# Patient Record
Sex: MALE | Race: WHITE | NOT HISPANIC OR LATINO | Employment: FULL TIME | URBAN - NONMETROPOLITAN AREA
[De-identification: names, ages, dates, MRNs, and addresses within clinical notes are randomized per-mention and may not be internally consistent; named-entity substitution may affect disease eponyms.]

---

## 2023-09-14 ENCOUNTER — OFFICE VISIT (OUTPATIENT)
Dept: INTERNAL MEDICINE | Facility: CLINIC | Age: 58
End: 2023-09-14
Payer: COMMERCIAL

## 2023-09-14 VITALS
TEMPERATURE: 97.8 F | HEART RATE: 73 BPM | BODY MASS INDEX: 33.8 KG/M2 | DIASTOLIC BLOOD PRESSURE: 88 MMHG | SYSTOLIC BLOOD PRESSURE: 153 MMHG | WEIGHT: 223 LBS | HEIGHT: 68 IN | OXYGEN SATURATION: 95 %

## 2023-09-14 DIAGNOSIS — R73.9 ELEVATED BLOOD SUGAR: ICD-10-CM

## 2023-09-14 DIAGNOSIS — I50.812 CHRONIC RIGHT-SIDED CONGESTIVE HEART FAILURE: ICD-10-CM

## 2023-09-14 DIAGNOSIS — Z72.0 TOBACCO ABUSE: ICD-10-CM

## 2023-09-14 DIAGNOSIS — M54.42 ACUTE MIDLINE LOW BACK PAIN WITH BILATERAL SCIATICA: Primary | ICD-10-CM

## 2023-09-14 DIAGNOSIS — M54.41 ACUTE MIDLINE LOW BACK PAIN WITH BILATERAL SCIATICA: Primary | ICD-10-CM

## 2023-09-14 DIAGNOSIS — Z12.5 SCREENING PSA (PROSTATE SPECIFIC ANTIGEN): ICD-10-CM

## 2023-09-14 RX ORDER — ISOSORBIDE MONONITRATE 60 MG/1
1 TABLET, EXTENDED RELEASE ORAL DAILY
COMMUNITY
Start: 2023-07-31

## 2023-09-14 RX ORDER — DICLOFENAC SODIUM 75 MG/1
75 TABLET, DELAYED RELEASE ORAL 2 TIMES DAILY
Qty: 60 TABLET | Refills: 11 | Status: SHIPPED | OUTPATIENT
Start: 2023-09-14

## 2023-09-14 RX ORDER — AMLODIPINE BESYLATE AND BENAZEPRIL HYDROCHLORIDE 5; 20 MG/1; MG/1
1 CAPSULE ORAL DAILY
COMMUNITY

## 2023-09-14 RX ORDER — CHOLECALCIFEROL (VITAMIN D3) 125 MCG
5 CAPSULE ORAL
COMMUNITY

## 2023-09-14 RX ORDER — ONDANSETRON 4 MG/1
4 TABLET, FILM COATED ORAL DAILY
Qty: 20 TABLET | Refills: 5 | Status: SHIPPED | OUTPATIENT
Start: 2023-09-14

## 2023-09-14 RX ORDER — PROPRANOLOL HYDROCHLORIDE 40 MG/1
20 TABLET ORAL 2 TIMES DAILY
Qty: 180 TABLET | Refills: 3 | Status: SHIPPED | OUTPATIENT
Start: 2023-09-14

## 2023-09-14 RX ORDER — KETOROLAC TROMETHAMINE 30 MG/ML
60 INJECTION, SOLUTION INTRAMUSCULAR; INTRAVENOUS ONCE
Status: COMPLETED | OUTPATIENT
Start: 2023-09-14 | End: 2023-09-14

## 2023-09-14 RX ORDER — ATORVASTATIN CALCIUM 80 MG/1
80 TABLET, FILM COATED ORAL DAILY
Qty: 90 TABLET | Refills: 3 | Status: SHIPPED | OUTPATIENT
Start: 2023-09-14

## 2023-09-14 RX ORDER — VARENICLINE TARTRATE 1 MG/1
1 TABLET, FILM COATED ORAL 2 TIMES DAILY
Qty: 56 TABLET | Refills: 4 | Status: SHIPPED | OUTPATIENT
Start: 2023-10-12 | End: 2024-02-29

## 2023-09-14 RX ORDER — VARENICLINE TARTRATE 0.5 MG/1
TABLET, FILM COATED ORAL
Qty: 95 TABLET | Refills: 0 | Status: SHIPPED | OUTPATIENT
Start: 2023-09-14

## 2023-09-14 RX ORDER — ONDANSETRON 4 MG/1
4 TABLET, FILM COATED ORAL DAILY
COMMUNITY
Start: 2023-05-04 | End: 2023-09-14 | Stop reason: SDUPTHER

## 2023-09-14 RX ORDER — IBUPROFEN 200 MG
200 TABLET ORAL EVERY 8 HOURS PRN
COMMUNITY
End: 2023-09-14

## 2023-09-14 RX ORDER — CLONIDINE HYDROCHLORIDE 0.1 MG/1
0.1 TABLET ORAL 3 TIMES DAILY
COMMUNITY

## 2023-09-14 RX ORDER — BUSPIRONE HYDROCHLORIDE 7.5 MG/1
3.75 TABLET ORAL 2 TIMES DAILY
COMMUNITY

## 2023-09-14 RX ORDER — ASPIRIN 81 MG/1
81 TABLET ORAL
COMMUNITY

## 2023-09-14 RX ORDER — METHYLPREDNISOLONE ACETATE 80 MG/ML
80 INJECTION, SUSPENSION INTRA-ARTICULAR; INTRALESIONAL; INTRAMUSCULAR; SOFT TISSUE ONCE
Status: COMPLETED | OUTPATIENT
Start: 2023-09-14 | End: 2023-09-14

## 2023-09-14 RX ORDER — TADALAFIL 20 MG/1
40 TABLET ORAL
COMMUNITY

## 2023-09-14 RX ORDER — PROPRANOLOL HYDROCHLORIDE 40 MG/1
0.5 TABLET ORAL 2 TIMES DAILY
COMMUNITY
Start: 2023-06-13 | End: 2023-09-14 | Stop reason: SDUPTHER

## 2023-09-14 RX ORDER — ERGOCALCIFEROL 1.25 MG/1
50000 CAPSULE ORAL
COMMUNITY

## 2023-09-14 RX ORDER — ATORVASTATIN CALCIUM 80 MG/1
1 TABLET, FILM COATED ORAL DAILY
COMMUNITY
Start: 2023-06-13 | End: 2023-09-14 | Stop reason: SDUPTHER

## 2023-09-14 RX ORDER — CETIRIZINE HYDROCHLORIDE 10 MG/1
1 TABLET ORAL DAILY
COMMUNITY

## 2023-09-14 RX ORDER — FUROSEMIDE 20 MG/1
1 TABLET ORAL DAILY
COMMUNITY
Start: 2023-06-13

## 2023-09-14 RX ADMIN — KETOROLAC TROMETHAMINE 60 MG: 30 INJECTION, SOLUTION INTRAMUSCULAR; INTRAVENOUS at 14:11

## 2023-09-14 RX ADMIN — METHYLPREDNISOLONE ACETATE 80 MG: 80 INJECTION, SUSPENSION INTRA-ARTICULAR; INTRALESIONAL; INTRAMUSCULAR; SOFT TISSUE at 14:12

## 2023-09-14 NOTE — PROGRESS NOTES
"Chief Complaint  Hypertension and Hyperlipidemia    Subjective        Juarez Wade presents to [facility]  History of Present Illness    The Patient presents for evaluation of low back pain. The patient has had no prior back problems. Symptoms have been present for 8 week and are unchanged.  Onset was related to / precipitated by no known injury. The pain is located in the across the lower back and radiates to the right hip, left hip. The pain is described as aching and occurs all day. He rates his pain as mild. Symptoms are exacerbated by lying down. Symptoms are improved by change in body position, NSAIDs, and stretching. he has also tried nothing which provided no symptom relief. he has no other symptoms associated with the back pain. The patient has no \"red flag\" history indicative of complicated back pain.     Patient would like to get a steroid and Toradol shot to help with his discomfort    I discussed with this patient tobacco product cessation.   I highlighted risks and damage it could cause if he continues to smoke or use other tobacco products.  I discussed the direct link to lung and throat cancer and the benefits of quitting.  We talked about the different options to assist with cessation including Chantix, Buproprion, patches, gum and cessation classes.  I spent 5 minutes discussing these issues with the patient.  Patient reported willingness to quit and was interested in setting a quit date for today.  He had used Chantix in the past and would like to get back on this at this time prescription for Chantix prescribed today for initiation    Patient would also like to do his routine wellness screening labs today.  Patient had had a previously elevated blood sugars we will check a hemoglobin A1c patient would also like to go ahead and get PSA screening done at this time.  Due to his history of congestive heart failure we will also obtain a CBC lipid profile and " "comprehensive metabolic panel patient also takes occasional Zofran for nausea and would like to have this available.  No other problems complaints or concerns    Objective   Vital Signs:  /88 (BP Location: Left arm, Patient Position: Sitting, Cuff Size: Adult)   Pulse 73   Temp 97.8 °F (36.6 °C) (Infrared)   Ht 171.5 cm (67.5\")   Wt 101 kg (223 lb)   SpO2 95%   BMI 34.41 kg/m²   Estimated body mass index is 34.41 kg/m² as calculated from the following:    Height as of this encounter: 171.5 cm (67.5\").    Weight as of this encounter: 101 kg (223 lb).             Physical Exam  Vitals and nursing note reviewed.   Constitutional:       General: He is not in acute distress.     Appearance: Normal appearance.   HENT:      Head: Normocephalic.   Eyes:      Extraocular Movements: Extraocular movements intact.      Pupils: Pupils are equal, round, and reactive to light.   Cardiovascular:      Rate and Rhythm: Normal rate and regular rhythm.      Heart sounds: Normal heart sounds. No murmur heard.  Pulmonary:      Effort: Pulmonary effort is normal. No respiratory distress.      Breath sounds: Normal breath sounds. No rhonchi or rales.   Abdominal:      General: Abdomen is flat. Bowel sounds are normal.      Palpations: Abdomen is soft.   Neurological:      General: No focal deficit present.      Mental Status: He is alert.      Result Review :                   Assessment and Plan   Diagnoses and all orders for this visit:    1. Acute midline low back pain with bilateral sciatica (Primary)  -     XR Spine Lumbar 2 or 3 View (In Office)  -     diclofenac (VOLTAREN) 75 MG EC tablet; Take 1 tablet by mouth 2 (Two) Times a Day.  Dispense: 60 tablet; Refill: 11  -     methylPREDNISolone acetate (DEPO-medrol) injection 80 mg  -     ketorolac (TORADOL) injection 60 mg    2. Tobacco abuse  -     varenicline (CHANTIX) 1 MG tablet; Take 1 tablet by mouth 2 (Two) Times a Day for 140 days.  Dispense: 56 tablet; Refill: 4  - "     varenicline (Chantix) 0.5 MG tablet; Take 0.5 mg po daily x 3 days, then 0.5 mg po bid x 4 days, then 1 mg po bid  Dispense: 95 tablet; Refill: 0    3. Chronic right-sided congestive heart failure  -     propranolol (INDERAL) 40 MG tablet; Take 0.5 tablets by mouth 2 (Two) Times a Day.  Dispense: 180 tablet; Refill: 3  -     atorvastatin (LIPITOR) 80 MG tablet; Take 1 tablet by mouth Daily.  Dispense: 90 tablet; Refill: 3  -     CBC & Differential  -     Lipid Panel  -     Comprehensive Metabolic Panel    4. Elevated blood sugar  -     Hemoglobin A1c; Future    5. Screening PSA (prostate specific antigen)  -     PSA Screen    Other orders  -     ondansetron (ZOFRAN) 4 MG tablet; Take 1 tablet by mouth Daily.  Dispense: 20 tablet; Refill: 5      EMR Dictation/Transcription disclaimer:   Some of this note may be an electronic transcription/translation of spoken language to printed text. The electronic translation of spoken language may permit erroneous, or at times, nonsensical words or phrases to be inadvertently transcribed; Although I have reviewed the note for such errors, some may still exist.        Follow Up   No follow-ups on file.  Patient was given instructions and counseling regarding his condition or for health maintenance advice. Please see specific information pulled into the AVS if appropriate.

## 2023-09-16 LAB
ALBUMIN SERPL-MCNC: 4.3 G/DL (ref 3.8–4.9)
ALBUMIN/GLOB SERPL: 1.4 {RATIO} (ref 1.2–2.2)
ALP SERPL-CCNC: 115 IU/L (ref 44–121)
ALT SERPL-CCNC: 17 IU/L (ref 0–44)
AST SERPL-CCNC: 11 IU/L (ref 0–40)
BASOPHILS # BLD AUTO: 0.2 X10E3/UL (ref 0–0.2)
BASOPHILS NFR BLD AUTO: 1 %
BILIRUB SERPL-MCNC: 0.4 MG/DL (ref 0–1.2)
BUN SERPL-MCNC: 11 MG/DL (ref 6–24)
BUN/CREAT SERPL: 12 (ref 9–20)
CALCIUM SERPL-MCNC: 9.7 MG/DL (ref 8.7–10.2)
CHLORIDE SERPL-SCNC: 98 MMOL/L (ref 96–106)
CHOLEST SERPL-MCNC: 246 MG/DL (ref 100–199)
CO2 SERPL-SCNC: 21 MMOL/L (ref 20–29)
CREAT SERPL-MCNC: 0.91 MG/DL (ref 0.76–1.27)
EGFRCR SERPLBLD CKD-EPI 2021: 97 ML/MIN/1.73
EOSINOPHIL # BLD AUTO: 0.2 X10E3/UL (ref 0–0.4)
EOSINOPHIL NFR BLD AUTO: 2 %
ERYTHROCYTE [DISTWIDTH] IN BLOOD BY AUTOMATED COUNT: 15.9 % (ref 11.6–15.4)
GLOBULIN SER CALC-MCNC: 3 G/DL (ref 1.5–4.5)
GLUCOSE SERPL-MCNC: 97 MG/DL (ref 70–99)
HCT VFR BLD AUTO: 42.9 % (ref 37.5–51)
HDLC SERPL-MCNC: 59 MG/DL
HGB BLD-MCNC: 12.6 G/DL (ref 13–17.7)
IMM GRANULOCYTES # BLD AUTO: 0 X10E3/UL (ref 0–0.1)
IMM GRANULOCYTES NFR BLD AUTO: 0 %
LDLC SERPL CALC-MCNC: 172 MG/DL (ref 0–99)
LYMPHOCYTES # BLD AUTO: 2.7 X10E3/UL (ref 0.7–3.1)
LYMPHOCYTES NFR BLD AUTO: 20 %
MCH RBC QN AUTO: 25.1 PG (ref 26.6–33)
MCHC RBC AUTO-ENTMCNC: 29.4 G/DL (ref 31.5–35.7)
MCV RBC AUTO: 86 FL (ref 79–97)
MONOCYTES # BLD AUTO: 1.1 X10E3/UL (ref 0.1–0.9)
MONOCYTES NFR BLD AUTO: 8 %
NEUTROPHILS # BLD AUTO: 9.8 X10E3/UL (ref 1.4–7)
NEUTROPHILS NFR BLD AUTO: 69 %
PLATELET # BLD AUTO: 375 X10E3/UL (ref 150–450)
POTASSIUM SERPL-SCNC: 4.1 MMOL/L (ref 3.5–5.2)
PROT SERPL-MCNC: 7.3 G/DL (ref 6–8.5)
PSA SERPL-MCNC: 2.1 NG/ML (ref 0–4)
RBC # BLD AUTO: 5.02 X10E6/UL (ref 4.14–5.8)
SODIUM SERPL-SCNC: 138 MMOL/L (ref 134–144)
TRIGL SERPL-MCNC: 86 MG/DL (ref 0–149)
VLDLC SERPL CALC-MCNC: 15 MG/DL (ref 5–40)
WBC # BLD AUTO: 14.1 X10E3/UL (ref 3.4–10.8)

## 2023-09-20 ENCOUNTER — APPOINTMENT (OUTPATIENT)
Dept: ULTRASOUND IMAGING | Facility: HOSPITAL | Age: 58
End: 2023-09-20
Payer: COMMERCIAL

## 2023-09-20 ENCOUNTER — HOSPITAL ENCOUNTER (EMERGENCY)
Facility: HOSPITAL | Age: 58
Discharge: HOME | End: 2023-09-20
Attending: STUDENT IN AN ORGANIZED HEALTH CARE EDUCATION/TRAINING PROGRAM
Payer: COMMERCIAL

## 2023-09-20 VITALS
WEIGHT: 218 LBS | BODY MASS INDEX: 34.21 KG/M2 | HEIGHT: 67 IN | DIASTOLIC BLOOD PRESSURE: 60 MMHG | OXYGEN SATURATION: 95 % | RESPIRATION RATE: 20 BRPM | SYSTOLIC BLOOD PRESSURE: 121 MMHG | TEMPERATURE: 98.5 F | HEART RATE: 61 BPM

## 2023-09-20 DIAGNOSIS — N45.1 EPIDIDYMITIS: Primary | ICD-10-CM

## 2023-09-20 DIAGNOSIS — I86.1 VARICOCELE: ICD-10-CM

## 2023-09-20 DIAGNOSIS — N50.812 PAIN IN LEFT TESTICLE: ICD-10-CM

## 2023-09-20 LAB
BILIRUB UR QL STRIP: NEGATIVE
CLARITY UR: CLEAR
COLOR UR: YELLOW
GLUCOSE UR STRIP-MCNC: NEGATIVE MG/DL
HGB UR QL STRIP.AUTO: NEGATIVE
KETONES UR QL STRIP: NEGATIVE
LEUKOCYTE ESTERASE UR QL STRIP.AUTO: NEGATIVE
NITRITE UR QL STRIP: NEGATIVE
PH UR STRIP.AUTO: 6.5 [PH] (ref 5–8)
PROT UR QL STRIP: NEGATIVE
SP GR UR STRIP: 1.01 (ref 1–1.03)
UROBILINOGEN UR QL STRIP: NORMAL

## 2023-09-20 PROCEDURE — 25010000002 CEFTRIAXONE PER 250 MG: Performed by: STUDENT IN AN ORGANIZED HEALTH CARE EDUCATION/TRAINING PROGRAM

## 2023-09-20 PROCEDURE — 81003 URINALYSIS AUTO W/O SCOPE: CPT | Performed by: STUDENT IN AN ORGANIZED HEALTH CARE EDUCATION/TRAINING PROGRAM

## 2023-09-20 PROCEDURE — 93975 VASCULAR STUDY: CPT

## 2023-09-20 PROCEDURE — 99284 EMERGENCY DEPT VISIT MOD MDM: CPT

## 2023-09-20 PROCEDURE — 96372 THER/PROPH/DIAG INJ SC/IM: CPT

## 2023-09-20 PROCEDURE — 76870 US EXAM SCROTUM: CPT

## 2023-09-20 RX ORDER — DOXYCYCLINE 100 MG/1
100 CAPSULE ORAL 2 TIMES DAILY
Qty: 20 CAPSULE | Refills: 0 | Status: SHIPPED | OUTPATIENT
Start: 2023-09-20 | End: 2023-09-30

## 2023-09-20 RX ADMIN — LIDOCAINE HYDROCHLORIDE 500 MG: 10 INJECTION, SOLUTION EPIDURAL; INFILTRATION; INTRACAUDAL; PERINEURAL at 23:24

## 2023-09-20 NOTE — Clinical Note
[facility] EMERGENCY DEPARTMENT  [address]  [facility] [address]  Phone: [phone]    Juarez Wade was seen and treated in our emergency department on 9/20/2023.  He may return to work on 09/22/2023.         Thank you for choosing [facility].    Tera Brannon MD

## 2023-09-21 NOTE — DISCHARGE INSTRUCTIONS
Today you are seen for your testicle pain and you have epididymitis which is an infection of part of the testicle as well as a varicocele which is a pocket of fluid that can occur within your scrotum.  Please take the prescribed antibiotic and follow-up department care provider within 2 days for further management.  Many primary care providers manage this but if he would like you to follow-up with a neurologist I have also attached the number for referral.  If any of her symptoms worsen prior please return to the emergency department immediately.

## 2023-09-21 NOTE — ED PROVIDER NOTES
EMERGENCY DEPARTMENT ATTENDING PROGRESS NOTE    Patient Name: Juarez Wade    Chief Complaint   Patient presents with    Testicle Pain         MEDICAL DECISION MAKING (ADDENDUM TO MDM OF PRIOR PROVIDER):    Juarez Wade is a 58 y.o. male who was initially seen by the prior emergency medicine provider, Dr. Yung. Please see their note for full history and physical exam.    Patient had presented to the ED with acicular pain he was suspicious for epididymitis.  Ultrasound read was pending.  Urinalysis has been normal.    Labs Reviewed   URINALYSIS W/ CULTURE IF INDICATED - Normal    Narrative:     In absence of clinical symptoms, the presence of pyuria, bacteria, and/or nitrites on the urinalysis result does not correlate with infection.  Urine microscopic not indicated.     US Scrotum & Testicles   Final Result   1. Diffuse enlargement and increased vascularity of the left epididymis   suggesting acute epididymitis. There is a moderate left-sided hydrocele   which does demonstrate some complexity including internal septations   suggesting it may represent a pyocele.   2. Right testicle is normal in appearance. There is normal color flow to   both testicles with no evidence of torsion. A small right hydrocele is   present.       This report was signed and finalized on 9/20/2023 10:16 PM CDT by Dr. Angelo Boogie MD.          US Testicular or Ovarian Vascular Complete    (Results Pending)       Ultrasound consistent with epididymitis with a varicocele.  Patient was given intramuscular ceftriaxone and discharged with a scription for doxycycline for appropriate treatment of epididymitis.  Counseled regarding results as well as plan to follow with his primary care provider or urology as an outpatient closely.  He is given return precautions to the emergency department for worsening symptoms.      ED Diagnosis:  Pain in left testicle; Epididymitis; Varicocele    Disposition: to home  Follow up plan: PCP follow  up within 2 days, urology within 1 week if primary care provider does not want to manage, return to ED immediately if symptoms worsen        Signed:  Tera Brannon MD  Emergency Medicine Physician    Please note that portions of this note were completed with a voice recognition program.      Tera Brannon MD  09/21/23 0151

## 2023-09-21 NOTE — ED PROVIDER NOTES
Subjective   History of Present Illness  Patient presents due to testicular pain.  Present in his left testicle.  Present for 6 days.  Gradual in onset.  Mostly feels like an ache in his posterior left testicle.  Moves up his left inguinal canal.  No history of hernia.  No abdominal pain.  Eating and drinking normally, no vomiting or nausea.  No diarrhea, no difficulty with urination or dysuria.  He has also noticed some sedimentary changes in his urine and his urine has a strong smell.  No history of testicular problems or surgeries on his testicles.  Has a history of high blood pressure and his surgical history is reviewed as per below.    Review of Systems   Constitutional:  Negative for chills and fever.   Respiratory:  Negative for cough and shortness of breath.    Cardiovascular:  Negative for chest pain and palpitations.   Gastrointestinal:  Negative for abdominal pain and vomiting.   Genitourinary:  Positive for dysuria and testicular pain. Negative for difficulty urinating.   Neurological:  Negative for syncope and light-headedness.     Past Medical History:   Diagnosis Date    Anxiety     GERD (gastroesophageal reflux disease)     Hyperlipidemia     Hypertension     Vitamin D deficiency        Allergies   Allergen Reactions    Adhesive Tape Rash       Past Surgical History:   Procedure Laterality Date    APPENDECTOMY      CARDIAC CATHETERIZATION      CHOLECYSTECTOMY      HERNIA REPAIR         Family History   Problem Relation Age of Onset    Heart disease Mother     Dementia Mother     No Known Problems Father     Cancer Maternal Uncle        Social History     Socioeconomic History    Marital status:    Tobacco Use    Smoking status: Every Day     Packs/day: 0.50     Years: 10.00     Pack years: 5.00     Types: Cigarettes     Last attempt to quit: 2023     Years since quittin.0    Smokeless tobacco: Never   Vaping Use    Vaping Use: Never used   Substance and Sexual Activity    Alcohol  use: Yes     Comment: OCC    Drug use: Never           Objective   Physical Exam  Vitals reviewed.   Constitutional:       General: He is not in acute distress.  HENT:      Head: Normocephalic and atraumatic.   Eyes:      Extraocular Movements: Extraocular movements intact.      Conjunctiva/sclera: Conjunctivae normal.   Cardiovascular:      Pulses: Normal pulses.      Heart sounds: Normal heart sounds.   Pulmonary:      Effort: Pulmonary effort is normal. No respiratory distress.   Abdominal:      General: Abdomen is flat. There is no distension.      Tenderness: There is no abdominal tenderness.   Genitourinary:     Comments: Left testicle swollen with posterior tenderness to palpation.  Cremasteric is intact on left.  Normal lie of the testicle.  No palpable hernia in the inguinal canal.  No overlying induration, erythema, or warmth.  Musculoskeletal:      Cervical back: Normal range of motion and neck supple.   Skin:     General: Skin is warm and dry.   Neurological:      General: No focal deficit present.      Mental Status: He is alert. Mental status is at baseline.   Psychiatric:         Behavior: Behavior normal.         Thought Content: Thought content normal.       Procedures           ED Course                                           Medical Decision Making  Problems Addressed:  Epididymitis: complicated acute illness or injury  Pain in left testicle: complicated acute illness or injury  Varicocele: complicated acute illness or injury    Amount and/or Complexity of Data Reviewed  Radiology: ordered.    Risk  Prescription drug management.        Juarez Wade is a 58 y.o. male with PMH above who presents to the Emergency Department with testicular pain.  Most likely epididymitis.  Also consider UTI.  Vital signs are stable, patient urinating normal amount, not fluid overloaded, no history of renal dysfunction, serum labs are not required at this time with an isolated testicular complaint.  Less likely  torsion given the exam; urinalysis and ultrasound ordered.     ED Course:   -workup pending at time of signout to Dr. Brannon      Final diagnosis: testicular pain    All questions answered. Patient/family was understanding and in agreement with today's assessment and plan. The patient was monitored during their stay in the ED and dispositioned without acute event.    Electronically signed by:  Pancho Yung MD 9/21/2023 10:04 CDT      Note: Dragon medical dictation software was used in the creation of this note.        Final diagnoses:   Pain in left testicle   Epididymitis   Varicocele       ED Disposition  ED Disposition       ED Disposition   Discharge    Condition   Stable    Comment   --               Karthikeyan Marte MD  [address] [facility] [id number] [phone]    Schedule an appointment as soon as possible for a visit in 2 days      Livan Alberts MD  [address] [facility]  [place] [id number] [phone]    Schedule an appointment as soon as possible for a visit in 1 week  As needed, if your primary care provider does not want to manage    [facility] EMERGENCY DEPARTMENT  [address] [place] [id number] [phone]    If symptoms worsen         Medication List        New Prescriptions      doxycycline 100 MG capsule  Commonly known as: MONODOX  Take 1 capsule by mouth 2 (Two) Times a Day for 10 days.               Where to Get Your Medications        These medications were sent to [facility] [address] [place] [address] - [address] [place] [address] - [phone] [facility] [phone] [address] [address]      Phone: [phone]   doxycycline 100 MG capsule            Pancho Yung MD  09/21/23 1733

## 2023-10-04 RX ORDER — ERGOCALCIFEROL 1.25 MG/1
50000 CAPSULE ORAL
Qty: 12 CAPSULE | Refills: 3 | Status: SHIPPED | OUTPATIENT
Start: 2023-10-04

## 2023-10-04 RX ORDER — AMLODIPINE BESYLATE AND BENAZEPRIL HYDROCHLORIDE 5; 20 MG/1; MG/1
1 CAPSULE ORAL DAILY
Qty: 90 CAPSULE | Refills: 3 | Status: SHIPPED | OUTPATIENT
Start: 2023-10-04

## 2023-10-04 RX ORDER — BUSPIRONE HYDROCHLORIDE 7.5 MG/1
3.75 TABLET ORAL 2 TIMES DAILY
Qty: 90 TABLET | Refills: 3 | Status: SHIPPED | OUTPATIENT
Start: 2023-10-04

## 2023-10-04 NOTE — TELEPHONE ENCOUNTER
Caller: Juarez Wade    Relationship: Self    Best call back number: [phone]     Requested Prescriptions:   Requested Prescriptions     Pending Prescriptions Disp Refills    amLODIPine-benazepril (LOTREL 5-20) 5-20 MG per capsule       Sig: Take 1 capsule by mouth Daily.    vitamin D (ERGOCALCIFEROL) 1.25 MG (76926 UT) capsule capsule 5 capsule      Sig: Take 1 capsule by mouth Every 7 (Seven) Days.    busPIRone (BUSPAR) 7.5 MG tablet       Sig: Take 0.5 tablets by mouth 2 (Two) Times a Day.        Pharmacy where request should be sent: [facility] [address] [place] [address] [phone] [facility] [phone]      Last office visit with prescribing clinician: 9/14/2023   Last telemedicine visit with prescribing clinician: Visit date not found   Next office visit with prescribing clinician: 3/14/2024     Additional details provided by patient: PATIENT REQUESTING REFILLS FOR ALL DAILY MEDS    Does the patient have less than a 3 day supply:  [x] Yes  [] No    Would you like a call back once the refill request has been completed: [x] Yes [] No    If the office needs to give you a call back, can they leave a voicemail: [x] Yes [] No    Wiley Barksdale Rep   10/04/23 13:49 CDT

## 2023-10-04 NOTE — TELEPHONE ENCOUNTER
Rx Refill Note  Requested Prescriptions     Pending Prescriptions Disp Refills    amLODIPine-benazepril (LOTREL 5-20) 5-20 MG per capsule       Sig: Take 1 capsule by mouth Daily.    vitamin D (ERGOCALCIFEROL) 1.25 MG (86443 UT) capsule capsule 5 capsule      Sig: Take 1 capsule by mouth Every 7 (Seven) Days.    busPIRone (BUSPAR) 7.5 MG tablet       Sig: Take 0.5 tablets by mouth 2 (Two) Times a Day.      Last office visit with prescribing clinician: 9/14/2023   Last telemedicine visit with prescribing clinician: Visit date not found   Next office visit with prescribing clinician: 3/14/2024                         Would you like a call back once the refill request has been completed: [] Yes [] No    If the office needs to give you a call back, can they leave a voicemail: [] Yes [] No    Alessia Guzman MA  10/04/23, 13:58 CDT

## 2023-10-17 RX ORDER — ISOSORBIDE MONONITRATE 60 MG/1
60 TABLET, EXTENDED RELEASE ORAL DAILY
Qty: 90 TABLET | Refills: 3 | Status: SHIPPED | OUTPATIENT
Start: 2023-10-17

## 2024-01-05 ENCOUNTER — OFFICE VISIT (OUTPATIENT)
Dept: CARDIOLOGY | Facility: CLINIC | Age: 59
End: 2024-01-05
Payer: COMMERCIAL

## 2024-01-05 VITALS
OXYGEN SATURATION: 98 % | BODY MASS INDEX: 34.37 KG/M2 | SYSTOLIC BLOOD PRESSURE: 126 MMHG | DIASTOLIC BLOOD PRESSURE: 72 MMHG | RESPIRATION RATE: 18 BRPM | HEART RATE: 44 BPM | WEIGHT: 219 LBS | HEIGHT: 67 IN

## 2024-01-05 DIAGNOSIS — I47.10 SUSTAINED SVT: Primary | ICD-10-CM

## 2024-01-05 PROCEDURE — 99204 OFFICE O/P NEW MOD 45 MIN: CPT | Performed by: STUDENT IN AN ORGANIZED HEALTH CARE EDUCATION/TRAINING PROGRAM

## 2024-01-05 PROCEDURE — 93000 ELECTROCARDIOGRAM COMPLETE: CPT | Performed by: STUDENT IN AN ORGANIZED HEALTH CARE EDUCATION/TRAINING PROGRAM

## 2024-01-05 RX ORDER — FLUTICASONE PROPIONATE 50 MCG
2 SPRAY, SUSPENSION (ML) NASAL DAILY
COMMUNITY

## 2024-01-05 NOTE — PROGRESS NOTES
"EP NEW PATIENT VISIT    Chief Complaint  Rapid Heart Rate (NEW PATIENT - PAUSES SEEN ON HOLTER REPORT)    Subjective        History of Present Illness    EP Problems:  1.  Sustained SVT  2.  Nocturnal pauses    Cardiology Problems:  1.  Hypertension  2.  CAD with ?  -Previously told by Dr. Baez that one of the \"main arteries was completely blocked\"    Medical Problems:  1.  Tobacco use disorder      Juarez Wade is a 58 y.o. male with problem list as above who presents to the clinic for evaluation of sustained SVT.  He had a Holter monitor showing sustained SVT lasting for greater than 3 hours in duration at a heart rate of approximately 150 bpm without visible P waves.  He states that he has episodes approximately 1-2 times per week.  The episodes make him feel poorly and tired.  He cannot lay down when he is having the episodes due to the symptoms.  He has tried vagal maneuvers in the past with variable results.  As such, he was advised to come to EP for further evaluation.    Objective   Vital Signs:  /72 (BP Location: Right arm, Patient Position: Sitting)   Pulse (!) 44   Resp 18   Ht 170.2 cm (67\")   Wt 99.3 kg (219 lb)   SpO2 98%   BMI 34.30 kg/m²   Estimated body mass index is 34.3 kg/m² as calculated from the following:    Height as of this encounter: 170.2 cm (67\").    Weight as of this encounter: 99.3 kg (219 lb).      Physical Exam  Vitals reviewed.   Constitutional:       Appearance: He is obese.   Cardiovascular:      Rate and Rhythm: Normal rate and regular rhythm.      Pulses: Normal pulses.      Heart sounds: Normal heart sounds.   Pulmonary:      Effort: Pulmonary effort is normal.      Breath sounds: Normal breath sounds.   Musculoskeletal:         General: No swelling.   Neurological:      Mental Status: He is alert and oriented to person, place, and time.   Psychiatric:         Mood and Affect: Mood normal.         Judgment: Judgment normal.        Result Review :  The " following data was reviewed by: Raul Goodman MD on 01/05/2024:  CMP          9/14/2023    12:48   CMP   Glucose 97    BUN 11    Creatinine 0.91    Sodium 138    Potassium 4.1    Chloride 98    Calcium 9.7    Total Protein 7.3    Albumin 4.3    Globulin 3.0    Total Bilirubin 0.4    Alkaline Phosphatase 115    AST (SGOT) 11    ALT (SGPT) 17    BUN/Creatinine Ratio 12      CBC          9/14/2023    12:48   CBC   WBC 14.1    RBC 5.02    Hemoglobin 12.6    Hematocrit 42.9    MCV 86    MCH 25.1    MCHC 29.4    RDW 15.9    Platelets 375        Previous Holter monitor directly visualized independently reviewed: Predominantly sinus rhythm, episodes of sustained SVT with heart rates in the 150s with no visible P waves consistent with SVT.          ECG 12 Lead    Date/Time: 1/5/2024 8:34 AM  Performed by: Raul Goodman MD    Authorized by: Raul Goodman MD  Comparison: not compared with previous ECG   Previous ECG: no previous ECG available  Rhythm: sinus bradycardia  Rate: bradycardic  BPM: 44  Conduction: conduction normal  QRS axis: normal  Other: no other findings    Clinical impression: non-specific ECG              Assessment and Plan   Diagnoses and all orders for this visit:    1. Sustained SVT (Primary)        Juarez Wade is a 58 y.o. male with problem list as above who presents to the clinic for evaluation of sustained SVT.  We discussed the relatively benign nature of SVT, however in symptomatic patients with frequent episodes ablation is considered to best form of treatment with a class I recommendation.  Alternatives were discussed including antiarrhythmic medications, however given his bradycardia and possible drug effects, I do not think that this is as good of a treatment option for him.    I have discussed risks, benefits, and alternatives of an electrophysiology study and ablation for supraventricular tachycardia with the patient.  Alternatives discussed include continued observation and medical  management.  An electrophysiology study with ablation is an inherently high risk procedure with possible complications that include but are not limited to vascular access complications, internal bleeding, tamponade requiring pericardiocentesis or cardiac surgery, stroke, MI, embolism, myocardial injury, injury to the normal conduction system requiring a pacemaker, and ultimately death.  We also discussed that given the uncertain nature of the diagnosis, therapeutic efficacy can be variable.  Possibilities of recurrent arrhythmias and the possible need for additional procedures and/or medical therapy was discussed. Questions asked were appropriately answered.  No guarantees were made or implied.   With this in mind, they would like additional time to decide on whether or not to proceed.    Plan:  -Ablation is his best treatment option at the moment; he will think more regarding whether he would like to proceed  -Continue propranolol at current dose for now given bradycardia  -3-month follow-up in clinic             Follow Up   Return in about 3 months (around 4/5/2024).  Patient was given instructions and counseling regarding his condition or for health maintenance advice. Please see specific information pulled into the AVS if appropriate.     Part of this note may be an electronic transcription/translation of spoken language to printed text using the Dragon Dictation System.

## 2024-01-23 ENCOUNTER — PREP FOR SURGERY (OUTPATIENT)
Dept: OTHER | Facility: HOSPITAL | Age: 59
End: 2024-01-23
Payer: COMMERCIAL

## 2024-01-23 DIAGNOSIS — I47.10 SUSTAINED SVT: Primary | ICD-10-CM

## 2024-01-23 RX ORDER — SODIUM CHLORIDE 0.9 % (FLUSH) 0.9 %
10 SYRINGE (ML) INJECTION EVERY 12 HOURS SCHEDULED
OUTPATIENT
Start: 2024-01-23

## 2024-01-23 RX ORDER — SODIUM CHLORIDE 9 MG/ML
40 INJECTION, SOLUTION INTRAVENOUS AS NEEDED
OUTPATIENT
Start: 2024-01-23

## 2024-01-23 RX ORDER — SODIUM CHLORIDE 0.9 % (FLUSH) 0.9 %
10 SYRINGE (ML) INJECTION AS NEEDED
OUTPATIENT
Start: 2024-01-23

## 2024-01-31 RX ORDER — ONDANSETRON 4 MG/1
4 TABLET, FILM COATED ORAL DAILY
Qty: 20 TABLET | Refills: 5 | Status: SHIPPED | OUTPATIENT
Start: 2024-01-31

## 2024-02-11 ENCOUNTER — NURSE TRIAGE (OUTPATIENT)
Dept: CALL CENTER | Facility: HOSPITAL | Age: 59
End: 2024-02-11
Payer: COMMERCIAL

## 2024-02-11 NOTE — TELEPHONE ENCOUNTER
"Reason for Disposition   Requesting regular office appointment    Additional Information   Negative: [1] Caller is not with the adult (patient) AND [2] reporting urgent symptoms   Negative: Lab result questions   Negative: Medication questions   Negative: Caller can't be reached by phone   Negative: Caller has already spoken to PCP or another triager   Negative: RN needs further essential information from caller in order to complete triage    Answer Assessment - Initial Assessment Questions  1. REASON FOR CALL or QUESTION: \"What is your reason for calling today?\" or \"How can I best help you?\" or \"What question do you have that I can help answer?\"      Appt    Protocols used: Information Only Call-ADULT-    "

## 2024-02-12 RX ORDER — CLONIDINE HYDROCHLORIDE 0.1 MG/1
0.1 TABLET ORAL 3 TIMES DAILY
Qty: 90 TABLET | Refills: 5 | Status: SHIPPED | OUTPATIENT
Start: 2024-02-12

## 2024-03-06 NOTE — DISCHARGE INSTRUCTIONS
Post-EP study Instructions    ACTIVITY    Avoid driving, operating machinery, or alcohol consumption for 24 hours after receiving sedation. In addition, avoid signing legal documents or participating in legal proceedings.    You may resume normal activities after 24 hours except for the following:    Avoid heavy lifting (no more than 10 pounds) and vigorous activities for a minimum of 7 days. This includes activities such as jogging, exercise classes, sports activities, etc.    You may resume walking and other normal activities.    Avoid sitting more than 2 consecutive hours during waking hours for the first 3 days. If you are traveling, stop for brief (5 minutes) walks every two hours.    MEDICATIONS   Stop clonidine and propranolol  Increase amlodipine-benazepril to 10-40mg    MEDICAL FOLLOW UP   EP clinic follow up with Dr. Goodman on 4/12/2024..    PLEASE NOTIFY US IF YOU DEVELOP    Fever of 101 or greater during your first few days at home    The occurrence of a new, persistent cough or unexplained shortness of breath.    A groin bruise may be expected. Initial soreness and discomfort should improve over the first few days. If you continue to have discomfort or if bruising is excessive, please call us.    Patients often experience palpitations during the healing period.    Please call if you have an episode of palpitations accompanied by fast heart rate greater than 120 beats per minute for extended period that last longer than 1-2 days.    Mild chest soreness is common and may be treated with Tylenol, Advil, or Motrin.  This soreness typically worsens when taking deep breaths.  If you notice these symptoms, start one of these medications according to the package directions and continue for 2-3 days. If your symptoms are not relieved or become severe, please call us.      REASONS TO GO TO THE EMERGENCY ROOM FOR EVALUATION:   Severe chest pain  Significant shortness of breath at rest  Near passing out or passing  out episodes soon after your ablation  Symptoms of chest pain or shortness of breath associated with low blood pressure (reading less than 90 for the top number / systolic blood pressure)  Brisk bleeding or significant swelling from the groin where IVs were placed  Any concerns that an emergent or life threatening complication is occurring    If you have a clinical questions between the hours of 8am and 4pm, Monday - Friday please call the office and let us know your concern. Please leave a message if your call is not an emergency.    For emergencies, please go for evaluation at your nearest emergency department.    If you have a [employer] account, this an excellent way to communicate.  [employer] messages are checked Monday through Friday. Please allow 24-36 hours for your message to be answered.

## 2024-03-07 ENCOUNTER — HOSPITAL ENCOUNTER (OUTPATIENT)
Facility: HOSPITAL | Age: 59
Discharge: HOME | End: 2024-03-08
Attending: STUDENT IN AN ORGANIZED HEALTH CARE EDUCATION/TRAINING PROGRAM | Admitting: STUDENT IN AN ORGANIZED HEALTH CARE EDUCATION/TRAINING PROGRAM
Payer: COMMERCIAL

## 2024-03-07 DIAGNOSIS — I47.10 SUSTAINED SVT: ICD-10-CM

## 2024-03-07 PROBLEM — Q24.9 CARDIAC ABNORMALITY: Status: ACTIVE | Noted: 2024-03-07

## 2024-03-07 LAB
ANION GAP SERPL CALCULATED.3IONS-SCNC: 11 MMOL/L (ref 5–15)
BASOPHILS # BLD AUTO: 0.22 10*3/MM3 (ref 0–0.2)
BASOPHILS NFR BLD AUTO: 1.7 % (ref 0–1.5)
BUN SERPL-MCNC: 26 MG/DL (ref 8–23)
BUN/CREAT SERPL: 24.1 (ref 7–25)
CALCIUM SPEC-SCNC: 8.7 MG/DL (ref 8.6–10.5)
CHLORIDE SERPL-SCNC: 104 MMOL/L (ref 98–107)
CO2 SERPL-SCNC: 26 MMOL/L (ref 22–29)
CREAT SERPL-MCNC: 1.08 MG/DL (ref 0.76–1.27)
DEPRECATED RDW RBC AUTO: 48.6 FL (ref 37–54)
EGFRCR SERPLBLD CKD-EPI 2021: 78.6 ML/MIN/1.73
EOSINOPHIL # BLD AUTO: 0.45 10*3/MM3 (ref 0–0.4)
EOSINOPHIL NFR BLD AUTO: 3.4 % (ref 0.3–6.2)
ERYTHROCYTE [DISTWIDTH] IN BLOOD BY AUTOMATED COUNT: 16.6 % (ref 12.3–15.4)
GLUCOSE SERPL-MCNC: 90 MG/DL (ref 65–99)
HCT VFR BLD AUTO: 38 % (ref 37.5–51)
HGB BLD-MCNC: 12 G/DL (ref 13–17.7)
IMM GRANULOCYTES # BLD AUTO: 0.1 10*3/MM3 (ref 0–0.05)
IMM GRANULOCYTES NFR BLD AUTO: 0.8 % (ref 0–0.5)
INR PPP: 0.94 (ref 0.91–1.09)
LYMPHOCYTES # BLD AUTO: 2.59 10*3/MM3 (ref 0.7–3.1)
LYMPHOCYTES NFR BLD AUTO: 19.8 % (ref 19.6–45.3)
MCH RBC QN AUTO: 25.4 PG (ref 26.6–33)
MCHC RBC AUTO-ENTMCNC: 31.6 G/DL (ref 31.5–35.7)
MCV RBC AUTO: 80.5 FL (ref 79–97)
MONOCYTES # BLD AUTO: 1.36 10*3/MM3 (ref 0.1–0.9)
MONOCYTES NFR BLD AUTO: 10.4 % (ref 5–12)
NEUTROPHILS NFR BLD AUTO: 63.9 % (ref 42.7–76)
NEUTROPHILS NFR BLD AUTO: 8.39 10*3/MM3 (ref 1.7–7)
NRBC BLD AUTO-RTO: 0 /100 WBC (ref 0–0.2)
PLATELET # BLD AUTO: 385 10*3/MM3 (ref 140–450)
PMV BLD AUTO: 9.6 FL (ref 6–12)
POTASSIUM SERPL-SCNC: 5.1 MMOL/L (ref 3.5–5.2)
PROTHROMBIN TIME: 13 SECONDS (ref 11.8–14.8)
RBC # BLD AUTO: 4.72 10*6/MM3 (ref 4.14–5.8)
SODIUM SERPL-SCNC: 141 MMOL/L (ref 136–145)
WBC NRBC COR # BLD AUTO: 13.11 10*3/MM3 (ref 3.4–10.8)

## 2024-03-07 PROCEDURE — C1759 CATH, INTRA ECHOCARDIOGRAPHY: HCPCS | Performed by: STUDENT IN AN ORGANIZED HEALTH CARE EDUCATION/TRAINING PROGRAM

## 2024-03-07 PROCEDURE — 25010000002 HEPARIN (PORCINE) PER 1000 UNITS: Performed by: STUDENT IN AN ORGANIZED HEALTH CARE EDUCATION/TRAINING PROGRAM

## 2024-03-07 PROCEDURE — C1894 INTRO/SHEATH, NON-LASER: HCPCS | Performed by: STUDENT IN AN ORGANIZED HEALTH CARE EDUCATION/TRAINING PROGRAM

## 2024-03-07 PROCEDURE — 85610 PROTHROMBIN TIME: CPT | Performed by: STUDENT IN AN ORGANIZED HEALTH CARE EDUCATION/TRAINING PROGRAM

## 2024-03-07 PROCEDURE — 93010 ELECTROCARDIOGRAM REPORT: CPT | Performed by: INTERNAL MEDICINE

## 2024-03-07 PROCEDURE — 93653 COMPRE EP EVAL TX SVT: CPT | Performed by: STUDENT IN AN ORGANIZED HEALTH CARE EDUCATION/TRAINING PROGRAM

## 2024-03-07 PROCEDURE — 93005 ELECTROCARDIOGRAM TRACING: CPT | Performed by: STUDENT IN AN ORGANIZED HEALTH CARE EDUCATION/TRAINING PROGRAM

## 2024-03-07 PROCEDURE — 93662 INTRACARDIAC ECG (ICE): CPT | Performed by: STUDENT IN AN ORGANIZED HEALTH CARE EDUCATION/TRAINING PROGRAM

## 2024-03-07 PROCEDURE — C1760 CLOSURE DEV, VASC: HCPCS | Performed by: STUDENT IN AN ORGANIZED HEALTH CARE EDUCATION/TRAINING PROGRAM

## 2024-03-07 PROCEDURE — 85025 COMPLETE CBC W/AUTO DIFF WBC: CPT | Performed by: STUDENT IN AN ORGANIZED HEALTH CARE EDUCATION/TRAINING PROGRAM

## 2024-03-07 PROCEDURE — C1730 CATH, EP, 19 OR FEW ELECT: HCPCS | Performed by: STUDENT IN AN ORGANIZED HEALTH CARE EDUCATION/TRAINING PROGRAM

## 2024-03-07 PROCEDURE — 80048 BASIC METABOLIC PNL TOTAL CA: CPT | Performed by: STUDENT IN AN ORGANIZED HEALTH CARE EDUCATION/TRAINING PROGRAM

## 2024-03-07 PROCEDURE — C1732 CATH, EP, DIAG/ABL, 3D/VECT: HCPCS | Performed by: STUDENT IN AN ORGANIZED HEALTH CARE EDUCATION/TRAINING PROGRAM

## 2024-03-07 PROCEDURE — 25010000002 HEPARIN (PORCINE) 2000-0.9 UNIT/L-% SOLUTION: Performed by: STUDENT IN AN ORGANIZED HEALTH CARE EDUCATION/TRAINING PROGRAM

## 2024-03-07 PROCEDURE — C1766 INTRO/SHEATH,STRBLE,NON-PEEL: HCPCS | Performed by: STUDENT IN AN ORGANIZED HEALTH CARE EDUCATION/TRAINING PROGRAM

## 2024-03-07 PROCEDURE — 25010000002 FENTANYL CITRATE (PF) 50 MCG/ML SOLUTION: Performed by: STUDENT IN AN ORGANIZED HEALTH CARE EDUCATION/TRAINING PROGRAM

## 2024-03-07 PROCEDURE — 25010000002 MIDAZOLAM PER 1 MG: Performed by: STUDENT IN AN ORGANIZED HEALTH CARE EDUCATION/TRAINING PROGRAM

## 2024-03-07 PROCEDURE — S0260 H&P FOR SURGERY: HCPCS | Performed by: STUDENT IN AN ORGANIZED HEALTH CARE EDUCATION/TRAINING PROGRAM

## 2024-03-07 PROCEDURE — 25010000002 HEPARIN (PORCINE) 1000-0.9 UT/500ML-% SOLUTION: Performed by: STUDENT IN AN ORGANIZED HEALTH CARE EDUCATION/TRAINING PROGRAM

## 2024-03-07 RX ORDER — FLUTICASONE PROPIONATE 50 MCG
2 SPRAY, SUSPENSION (ML) NASAL DAILY
Status: DISCONTINUED | OUTPATIENT
Start: 2024-03-08 | End: 2024-03-08 | Stop reason: HOSPADM

## 2024-03-07 RX ORDER — LIDOCAINE HYDROCHLORIDE 20 MG/ML
INJECTION, SOLUTION INFILTRATION; PERINEURAL
Status: DISCONTINUED | OUTPATIENT
Start: 2024-03-07 | End: 2024-03-07 | Stop reason: HOSPADM

## 2024-03-07 RX ORDER — NAPROXEN 250 MG/1
250 TABLET ORAL 2 TIMES DAILY PRN
Status: DISCONTINUED | OUTPATIENT
Start: 2024-03-07 | End: 2024-03-08 | Stop reason: HOSPADM

## 2024-03-07 RX ORDER — PANTOPRAZOLE SODIUM 40 MG/1
40 TABLET, DELAYED RELEASE ORAL
Status: DISCONTINUED | OUTPATIENT
Start: 2024-03-08 | End: 2024-03-08 | Stop reason: HOSPADM

## 2024-03-07 RX ORDER — HEPARIN SODIUM 200 [USP'U]/100ML
INJECTION, SOLUTION INTRAVENOUS
Status: DISCONTINUED | OUTPATIENT
Start: 2024-03-07 | End: 2024-03-07 | Stop reason: HOSPADM

## 2024-03-07 RX ORDER — CLONIDINE HYDROCHLORIDE 0.1 MG/1
0.1 TABLET ORAL 3 TIMES DAILY
Status: DISCONTINUED | OUTPATIENT
Start: 2024-03-07 | End: 2024-03-08 | Stop reason: HOSPADM

## 2024-03-07 RX ORDER — CHOLECALCIFEROL (VITAMIN D3) 125 MCG
5 CAPSULE ORAL NIGHTLY
Status: DISCONTINUED | OUTPATIENT
Start: 2024-03-07 | End: 2024-03-08 | Stop reason: HOSPADM

## 2024-03-07 RX ORDER — ASPIRIN 81 MG/1
81 TABLET ORAL DAILY
Status: DISCONTINUED | OUTPATIENT
Start: 2024-03-08 | End: 2024-03-08 | Stop reason: HOSPADM

## 2024-03-07 RX ORDER — SODIUM CHLORIDE 0.9 % (FLUSH) 0.9 %
10 SYRINGE (ML) INJECTION EVERY 12 HOURS SCHEDULED
Status: DISCONTINUED | OUTPATIENT
Start: 2024-03-07 | End: 2024-03-07 | Stop reason: HOSPADM

## 2024-03-07 RX ORDER — FENTANYL CITRATE 50 UG/ML
INJECTION, SOLUTION INTRAMUSCULAR; INTRAVENOUS
Status: DISCONTINUED | OUTPATIENT
Start: 2024-03-07 | End: 2024-03-07 | Stop reason: HOSPADM

## 2024-03-07 RX ORDER — BUSPIRONE HYDROCHLORIDE 5 MG/1
3.75 TABLET ORAL 2 TIMES DAILY
Status: DISCONTINUED | OUTPATIENT
Start: 2024-03-07 | End: 2024-03-08 | Stop reason: HOSPADM

## 2024-03-07 RX ORDER — LISINOPRIL 20 MG/1
20 TABLET ORAL
Status: DISCONTINUED | OUTPATIENT
Start: 2024-03-08 | End: 2024-03-08 | Stop reason: HOSPADM

## 2024-03-07 RX ORDER — HEPARIN SODIUM 1000 [USP'U]/ML
INJECTION, SOLUTION INTRAVENOUS; SUBCUTANEOUS
Status: DISCONTINUED | OUTPATIENT
Start: 2024-03-07 | End: 2024-03-07 | Stop reason: HOSPADM

## 2024-03-07 RX ORDER — SODIUM CHLORIDE 0.9 % (FLUSH) 0.9 %
10 SYRINGE (ML) INJECTION AS NEEDED
Status: DISCONTINUED | OUTPATIENT
Start: 2024-03-07 | End: 2024-03-07 | Stop reason: HOSPADM

## 2024-03-07 RX ORDER — MIDAZOLAM HYDROCHLORIDE 1 MG/ML
INJECTION INTRAMUSCULAR; INTRAVENOUS
Status: DISCONTINUED | OUTPATIENT
Start: 2024-03-07 | End: 2024-03-07 | Stop reason: HOSPADM

## 2024-03-07 RX ORDER — SODIUM CHLORIDE 9 MG/ML
40 INJECTION, SOLUTION INTRAVENOUS AS NEEDED
Status: DISCONTINUED | OUTPATIENT
Start: 2024-03-07 | End: 2024-03-07 | Stop reason: HOSPADM

## 2024-03-07 RX ORDER — ATORVASTATIN CALCIUM 40 MG/1
80 TABLET, FILM COATED ORAL DAILY
Status: DISCONTINUED | OUTPATIENT
Start: 2024-03-08 | End: 2024-03-08 | Stop reason: HOSPADM

## 2024-03-07 RX ORDER — ISOSORBIDE MONONITRATE 60 MG/1
60 TABLET, EXTENDED RELEASE ORAL DAILY
Status: DISCONTINUED | OUTPATIENT
Start: 2024-03-08 | End: 2024-03-08 | Stop reason: HOSPADM

## 2024-03-07 RX ORDER — FUROSEMIDE 20 MG/1
20 TABLET ORAL DAILY
Status: DISCONTINUED | OUTPATIENT
Start: 2024-03-07 | End: 2024-03-08 | Stop reason: HOSPADM

## 2024-03-07 RX ORDER — PROPRANOLOL HYDROCHLORIDE 40 MG/1
20 TABLET ORAL 2 TIMES DAILY
Status: DISCONTINUED | OUTPATIENT
Start: 2024-03-07 | End: 2024-03-08 | Stop reason: HOSPADM

## 2024-03-07 RX ORDER — AMLODIPINE BESYLATE 5 MG/1
5 TABLET ORAL
Status: DISCONTINUED | OUTPATIENT
Start: 2024-03-08 | End: 2024-03-08 | Stop reason: HOSPADM

## 2024-03-07 RX ADMIN — PROPRANOLOL HYDROCHLORIDE 20 MG: 40 TABLET ORAL at 21:42

## 2024-03-07 RX ADMIN — BUSPIRONE HYDROCHLORIDE 3.75 MG: 5 TABLET ORAL at 21:42

## 2024-03-07 RX ADMIN — APIXABAN 5 MG: 5 TABLET, FILM COATED ORAL at 21:42

## 2024-03-07 RX ADMIN — CLONIDINE HYDROCHLORIDE 0.1 MG: 0.1 TABLET ORAL at 21:42

## 2024-03-07 RX ADMIN — FUROSEMIDE 20 MG: 20 TABLET ORAL at 21:42

## 2024-03-07 RX ADMIN — Medication 5 MG: at 21:45

## 2024-03-07 NOTE — Clinical Note
Prepped: groin and subxiphoid process. Prepped with: ChloraPrep. The site was clipped. The patient was draped in a sterile fashion.

## 2024-03-07 NOTE — H&P
"Chief Complaint  Sustained SVT    Subjective        History of Present Illness    EP Problems:  1.  Sustained SVT  2.  Nocturnal pauses     Cardiology Problems:  1.  Hypertension  2.  CAD with ?  -Previously told by Dr. Baez that one of the \"main arteries was completely blocked\"     Medical Problems:  1.  Tobacco use disorder    Juarez Wade is a 59 y.o. male with past medical history as above who presents to the hospital for outpatient EP study and ablation for treatment of sustained SVT.  He has a history of sustained SVT seen on previous Holter monitors lasting several hours in duration.  The episodes occur frequently.  He has used vagal maneuvers in the past with variable results.  As such, we discussed risks and benefits of ablation and he chose to proceed.    Family History:  family history includes Cancer in his maternal uncle; Dementia in his mother; Heart disease in his mother; No Known Problems in his father.    Social History:  Social History     Tobacco Use    Smoking status: Every Day     Current packs/day: 0.00     Average packs/day: 0.5 packs/day for 10.0 years (5.0 ttl pk-yrs)     Types: Cigarettes     Start date: 2013     Last attempt to quit: 2023     Years since quittin.4    Smokeless tobacco: Never   Vaping Use    Vaping status: Never Used   Substance Use Topics    Alcohol use: Yes     Comment: OCC    Drug use: Never       Allergies:  Adhesive tape      Objective   Vital Signs:  /79 (BP Location: Left arm, Patient Position: Lying)   Pulse 55   Temp 97.5 °F (36.4 °C) (Temporal)   Resp 13   Ht 170.2 cm (67\")   Wt 98.8 kg (217 lb 12.8 oz)   SpO2 98%   BMI 34.11 kg/m²   Estimated body mass index is 34.11 kg/m² as calculated from the following:    Height as of this encounter: 170.2 cm (67\").    Weight as of this encounter: 98.8 kg (217 lb 12.8 oz).       Well-appearing, no acute distress  Normal work of breathing  Regular rate and rhythm  No significant edema    ASA: " 3  Mallampati: 2      Result Review :  Labs were reviewed with relevant findings as follows: No relevant findings               Assessment and Plan   Assessment/plan:  Juarez Wade is a 59 y.o. male who presents to the hospital for outpatient EP study and ablation for treatment of sustained SVT.  There are no apparent contraindications to proceeding and he is medically appropriate for outpatient management with this procedure.      I have discussed risks, benefits, and alternatives of an electrophysiology study and ablation for supraventricular tachycardia with the patient.  Alternatives discussed include continued observation and medical management.  An electrophysiology study with ablation is an inherently high risk procedure with possible complications that include but are not limited to vascular access complications, internal bleeding, tamponade requiring pericardiocentesis or cardiac surgery, stroke, MI, embolism, myocardial injury, injury to the normal conduction system requiring a pacemaker, and ultimately death.  We also discussed that given the uncertain nature of the diagnosis, therapeutic efficacy can be variable.  Possibilities of recurrent arrhythmias and the possible need for additional procedures and/or medical therapy was discussed. Questions asked were appropriately answered.  No guarantees were made or implied.     Despite this, they would still like to proceed.    Plan:   - Proceed with EP study and ablation for treatment of sustained SVT           Part of this note may be an electronic transcription/translation of spoken language to printed text using the Dragon Dictation System.

## 2024-03-08 VITALS
HEIGHT: 67 IN | WEIGHT: 217.8 LBS | BODY MASS INDEX: 34.18 KG/M2 | DIASTOLIC BLOOD PRESSURE: 71 MMHG | RESPIRATION RATE: 18 BRPM | HEART RATE: 50 BPM | OXYGEN SATURATION: 94 % | TEMPERATURE: 98.4 F | SYSTOLIC BLOOD PRESSURE: 140 MMHG

## 2024-03-08 PROCEDURE — 99238 HOSP IP/OBS DSCHRG MGMT 30/<: CPT | Performed by: STUDENT IN AN ORGANIZED HEALTH CARE EDUCATION/TRAINING PROGRAM

## 2024-03-08 RX ORDER — AMLODIPINE AND BENAZEPRIL HYDROCHLORIDE 10; 40 MG/1; MG/1
1 CAPSULE ORAL DAILY
Qty: 30 CAPSULE | Refills: 11 | Status: SHIPPED | OUTPATIENT
Start: 2024-03-08 | End: 2025-03-08

## 2024-03-08 RX ADMIN — CLONIDINE HYDROCHLORIDE 0.1 MG: 0.1 TABLET ORAL at 08:08

## 2024-03-08 RX ADMIN — PANTOPRAZOLE SODIUM 40 MG: 40 TABLET, DELAYED RELEASE ORAL at 06:29

## 2024-03-08 RX ADMIN — ATORVASTATIN CALCIUM 80 MG: 40 TABLET ORAL at 08:09

## 2024-03-08 RX ADMIN — ISOSORBIDE MONONITRATE 60 MG: 60 TABLET, EXTENDED RELEASE ORAL at 08:08

## 2024-03-08 RX ADMIN — FUROSEMIDE 20 MG: 20 TABLET ORAL at 08:08

## 2024-03-08 RX ADMIN — APIXABAN 5 MG: 5 TABLET, FILM COATED ORAL at 08:09

## 2024-03-08 RX ADMIN — ASPIRIN 81 MG: 81 TABLET, COATED ORAL at 08:09

## 2024-03-08 RX ADMIN — AMLODIPINE BESYLATE 5 MG: 5 TABLET ORAL at 08:09

## 2024-03-08 RX ADMIN — LISINOPRIL 20 MG: 20 TABLET ORAL at 08:09

## 2024-03-08 RX ADMIN — BUSPIRONE HYDROCHLORIDE 3.75 MG: 5 TABLET ORAL at 08:08

## 2024-03-08 NOTE — DISCHARGE SUMMARY
"                                                                                                  DISCHARGE NOTE    Patient Name: Juarez Wade  Age/Sex: 59 y.o. male  : 1965  MRN: [id number]    Date of Discharge:  3/8/2024   Date of Admit: 3/7/2024  Encounter Provider: Raul Goodman MD  Place of Service: [facility]  Patient Care Team:  Karthikeyan Marte MD as PCP - General (Family Medicine)    Subjective:     Discharge Diagnosis:    Sustained SVT    Cardiac abnormality        History of present illness:  EP Problems:  1.  Sustained SVT  2.  Nocturnal pauses     Cardiology Problems:  1.  Hypertension  2.  CAD with ?  -Previously told by Dr. Baez that one of the \"main arteries was completely blocked\"     Medical Problems:  1.  Tobacco use disorder     Hospital Course:   He underwent EP study and ablation yesterday.  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  He did well overnight without any evidence of bleeding or acute complications.  Telemetry showed sinus bradycardia without recurrent arrhythmias otherwise.    Vital Signs  Temp:  [97.4 °F (36.3 °C)-97.8 °F (36.6 °C)] 97.5 °F (36.4 °C)  Heart Rate:  [49-55] 49  Resp:  [13-18] 18  BP: (110-138)/(61-79) 138/75  No intake or output data in the 24 hours ending 24 0622    Physical Exam:  Well-appearing, no acute distress  Clear to auscultation bilaterally  Regular rate and rhythm  Femoral access site suture removed without evidence of swelling, hematoma, bleeding, drainage    Discharge Diet:    Dietary Orders (From admission, onward)       Start     Ordered    24  Diet: Cardiac; Healthy Heart (2-3 Na+); Fluid Consistency: Thin (IDDSI 0)  Diet Effective Now        References:    Diet Order [place]   Question Answer Comment   Diets: Cardiac    Cardiac Diet: Healthy Heart (2-3 Na+)    Fluid Consistency: Thin (IDDSI 0)        24 185                       Activity Restrictions at " Discharge:    No lifting more than 10lbs for 1 week.  No strenuous exercise for 1 week.        Medication changes:  1.  Stop clonidine and propranolol  2.  Increase amlodipine-benazepril to 10 mg - 40 mg daily    Discharge Medications     Discharge Medications        New Medications        Instructions Start Date   amLODIPine-benazepril 10-40 MG per capsule  Commonly known as: LOTREL  Replaces: amLODIPine-benazepril 5-20 MG per capsule   1 capsule, Oral, Daily             Continue These Medications        Instructions Start Date   aspirin 81 MG EC tablet   81 mg, Oral      atorvastatin 80 MG tablet  Commonly known as: LIPITOR   80 mg, Oral, Daily      busPIRone 7.5 MG tablet  Commonly known as: BUSPAR   3.75 mg, Oral, 2 Times Daily      diclofenac 75 MG EC tablet  Commonly known as: VOLTAREN   75 mg, Oral, 2 Times Daily      fluticasone 50 MCG/ACT nasal spray  Commonly known as: FLONASE   2 sprays, Nasal, Daily      furosemide 20 MG tablet  Commonly known as: LASIX   1 tablet, Oral, Daily      isosorbide mononitrate 60 MG 24 hr tablet  Commonly known as: IMDUR   60 mg, Oral, Daily      melatonin 5 MG tablet tablet   5 mg, Oral      OMEPRAZOLE PO   20 mg, Oral, Daily      ondansetron 4 MG tablet  Commonly known as: ZOFRAN   4 mg, Oral, Daily      vitamin D 1.25 MG (22463 UT) capsule capsule  Commonly known as: ERGOCALCIFEROL   50,000 Units, Oral, Every 7 Days             Stop These Medications      amLODIPine-benazepril 5-20 MG per capsule  Commonly known as: LOTREL 5-20  Replaced by: amLODIPine-benazepril 10-40 MG per capsule     cloNIDine 0.1 MG tablet  Commonly known as: CATAPRES     propranolol 40 MG tablet  Commonly known as: INDERAL              Discharge disposition: home    Follow-up Appointments   Follow-up Information       Karthikeyan Marte MD .    Specialty: Family Medicine  Contact information:  [address] [place] [address] [phone]                           Future Appointments   Date  Time Provider Department [place]   3/14/2024  4:00 PM Karthikeyan Marte MD [facility]   4/12/2024  8:00 AM Raul Goodman MD [facility]         Raul Goodman MD  03/08/24  06:22 CST

## 2024-03-08 NOTE — PLAN OF CARE
Goal Outcome Evaluation:             Problem: Adult Inpatient Plan of Care  Goal: Plan of Care Review  Outcome: Ongoing, Progressing  Flowsheets (Taken 3/8/2024 0421)  Outcome Evaluation: Pt is AAOx4, VSS, breathing is unlabored. Pt had ablation yesterday, drg in right groin c/d/i and skin is soft with no c/ o of pain. Pt layed flat until time was up and then ambulated to toilet without assistance or incident.  Pt has slept well overnight. Pt has been SB 44-56, even reached 36 on one occasion.  Pt anticipated going home today. He has no requests at this time. Care plan is ongoing.

## 2024-03-10 LAB
QT INTERVAL: 420 MS
QTC INTERVAL: 382 MS

## 2024-03-14 ENCOUNTER — OFFICE VISIT (OUTPATIENT)
Dept: INTERNAL MEDICINE | Facility: CLINIC | Age: 59
End: 2024-03-14
Payer: COMMERCIAL

## 2024-03-14 VITALS
RESPIRATION RATE: 18 BRPM | DIASTOLIC BLOOD PRESSURE: 89 MMHG | OXYGEN SATURATION: 97 % | TEMPERATURE: 100.4 F | WEIGHT: 217.4 LBS | SYSTOLIC BLOOD PRESSURE: 167 MMHG | BODY MASS INDEX: 34.12 KG/M2 | HEART RATE: 81 BPM | HEIGHT: 67 IN

## 2024-03-14 DIAGNOSIS — M54.42 CHRONIC MIDLINE LOW BACK PAIN WITH BILATERAL SCIATICA: ICD-10-CM

## 2024-03-14 DIAGNOSIS — Z23 NEED FOR PNEUMOCOCCAL VACCINATION: ICD-10-CM

## 2024-03-14 DIAGNOSIS — R73.03 BORDERLINE DIABETES: ICD-10-CM

## 2024-03-14 DIAGNOSIS — Z29.11 NEED FOR RSV IMMUNIZATION: ICD-10-CM

## 2024-03-14 DIAGNOSIS — R73.9 ELEVATED BLOOD SUGAR: ICD-10-CM

## 2024-03-14 DIAGNOSIS — Z12.11 COLON CANCER SCREENING: ICD-10-CM

## 2024-03-14 DIAGNOSIS — G89.29 CHRONIC MIDLINE LOW BACK PAIN WITH BILATERAL SCIATICA: ICD-10-CM

## 2024-03-14 DIAGNOSIS — Z11.59 ENCOUNTER FOR HEPATITIS C SCREENING TEST FOR LOW RISK PATIENT: Primary | ICD-10-CM

## 2024-03-14 DIAGNOSIS — M54.41 CHRONIC MIDLINE LOW BACK PAIN WITH BILATERAL SCIATICA: ICD-10-CM

## 2024-03-14 RX ORDER — KETOROLAC TROMETHAMINE 30 MG/ML
60 INJECTION, SOLUTION INTRAMUSCULAR; INTRAVENOUS ONCE
Status: COMPLETED | OUTPATIENT
Start: 2024-03-14 | End: 2024-03-14

## 2024-03-14 RX ORDER — METHYLPREDNISOLONE ACETATE 40 MG/ML
80 INJECTION, SUSPENSION INTRA-ARTICULAR; INTRALESIONAL; INTRAMUSCULAR; SOFT TISSUE ONCE
Status: COMPLETED | OUTPATIENT
Start: 2024-03-14 | End: 2024-03-14

## 2024-03-14 RX ADMIN — METHYLPREDNISOLONE ACETATE 80 MG: 40 INJECTION, SUSPENSION INTRA-ARTICULAR; INTRALESIONAL; INTRAMUSCULAR; SOFT TISSUE at 16:50

## 2024-03-14 RX ADMIN — KETOROLAC TROMETHAMINE 60 MG: 30 INJECTION, SOLUTION INTRAMUSCULAR; INTRAVENOUS at 16:48

## 2024-03-14 NOTE — LETTER
[facility]  Vaccine Consent Form    Patient Name:  Juarez Wade  Patient :  1965     Vaccine(s) Ordered    Arexvy RSV Vaccine (60+ yrs)  Pneumococcal Conjugate Vaccine 20-Valent All        Screening Checklist  The following questions should be completed prior to vaccination. If you answer “yes” to any question, it does not necessarily mean you should not be vaccinated. It just means we may need to clarify or ask more questions. If a question is unclear, please ask your healthcare provider to explain it.    Yes No   Any fever or moderate to severe illness today (mild illness and/or antibiotic treatment are not contraindications)?     Do you have a history of a serious reaction to any previous vaccinations, such as anaphylaxis, encephalopathy within 7 days, Guillain-[place] syndrome within 6 weeks, seizure?     Have you received any live vaccine(s) (e.g MMR, BALTAZAR) or any other vaccines in the last month (to ensure duplicate doses aren't given)?     Do you have an anaphylactic allergy to latex (DTaP, DTaP-IPV, Hep A, Hep B, MenB, RV, Td, Tdap), baker’s yeast (Hep B, HPV), polysorbates (RSV, nirsevimab, PCV 20, Rotavirrus, Tdap, Shingrix), or gelatin (BALTAZAR, MMR)?     Do you have an anaphylactic allergy to neomycin (Rabies, BALTAZAR, MMR, IPV, Hep A), polymyxin B (IPV), or streptomycin (IPV)?      Any cancer, leukemia, AIDS, or other immune system disorder? (BALTAZAR, MMR, RV)     Do you have a parent, brother, or sister with an immune system problem (if immune competence of vaccine recipient clinically verified, can proceed)? (MMR, BALTAZAR)     Any recent steroid treatments for >2 weeks, chemotherapy, or radiation treatment? (BALTAZAR, MMR)     Have you received antibody-containing blood transfusions or IVIG in the past 11 months (recommended interval is dependent on product)? (MMR, BALTAZAR)     Have you taken antiviral drugs (acyclovir, famciclovir, valacyclovir for BALTAZAR) in the last 24 or 48 hours, respectively?      Are you  "pregnant or planning to become pregnant within 1 month? (BALTAZAR, MMR, HPV, IPV, MenB, Abrexvy; For Hep B- refer to Engerix-B; For RSV - Abrysvo is indicated for 32-36 weeks of pregnancy from September to January)     For infants, have you ever been told your child has had intussusception or a medical emergency involving obstruction of the intestine (Rotavirus)? If not for an infant, can skip this question.         *Ordering Physicians/APC should be consulted if \"yes\" is checked by the patient or guardian above.  I have received, read, and understand the Vaccine Information Statement (VIS) for each vaccine ordered.  I have considered my or my child's health status as well as the health status of my close contacts.  I have taken the opportunity to discuss my vaccine questions with my or my child's health care provider.   I have requested that the ordered vaccine(s) be given to me or my child.  I understand the benefits and risks of the vaccines.  I understand that I should remain in the clinic for 15 minutes after receiving the vaccine(s).  _________________________________________________________  Signature of Patient or Parent/Legal Guardian ____________________  Date     "

## 2024-03-14 NOTE — PROGRESS NOTES
Subjective     Chief Complaint   Patient presents with    Back Pain     Follow up.        History of Present Illness    Juarez Wade is a 59 y.o. male who presents for a annual physical at this time. I discussed at preventative health care and cancer screening with him and its role in reducing types of cancer and other health problems appropriate for the patient's age.  Also discussed vaccines and current status with the patient.  Patient understands the risks and benefits of screening and is currently up-to-date with current recommendations that he chosses.    Patient has a history of borderline diabetes we will go ahead and order hemoglobin A1c as well as his routine wellness labs including a CBC comprehensive metabolic panel lipid profile and microalbumin urine.  Will also do this patient's hepatitis C screening at this time.  Patient would also like to get caught up on his vaccination protocols we will go ahead and give him a pneumococcal and RSV at this time.  She would also like to go ahead and get set up with a Cologuard for colon cancer screening at this time    Patient also has some chronic lower back pain this presented at his last visit he would like to get a steroid and Toradol shot to help with this at this time will also get an x-ray to evaluate his lower back pain.  Will also set this patient up with a TENS unit to help with his pain.    Patient's PMR from outside medical facility reviewed and noted.      Past Medical History:   Past Medical History:   Diagnosis Date    Anxiety     Arrhythmia ?    Cardiac abnormality 3/7/2024    GERD (gastroesophageal reflux disease)     Hyperlipidemia     Hypertension     Sleep apnea 2005    Sustained SVT 1/5/2024    Vitamin D deficiency      Past Surgical History:  Past Surgical History:   Procedure Laterality Date    APPENDECTOMY      CARDIAC ABLATION  03/07/2024    completed on this admission    CARDIAC CATHETERIZATION      CARDIAC ELECTROPHYSIOLOGY  PROCEDURE N/A 3/7/2024    Procedure: Ablation SVT;  Surgeon: Raul Goodman MD;  Location: [facility] INVASIVE LOCATION;  Service: Cardiovascular;  Laterality: N/A;    CHOLECYSTECTOMY      HERNIA REPAIR       Social History:  reports that he has been smoking cigarettes. He started smoking about 10 years ago. He has a 5 pack-year smoking history. He has never used smokeless tobacco. He reports current alcohol use of about 2.0 standard drinks of alcohol per week. He reports that he does not use drugs.    Family History: family history includes Cancer in his maternal uncle; Dementia in his mother; Heart disease in his mother; No Known Problems in his father.      Allergies:  Allergies   Allergen Reactions    Adhesive Tape Rash     Medications:  Prior to Admission medications    Medication Sig Start Date End Date Taking? Authorizing Provider   amLODIPine-benazepril (LOTREL) 10-40 MG per capsule Take 1 capsule by mouth Daily. 3/8/24 3/8/25 Yes Raul Goodman MD   aspirin 81 MG EC tablet Take 1 tablet by mouth.   Yes Maria E Verdin MD   atorvastatin (LIPITOR) 80 MG tablet Take 1 tablet by mouth Daily. 9/14/23  Yes Karthikeyan Marte MD   busPIRone (BUSPAR) 7.5 MG tablet Take 0.5 tablets by mouth 2 (Two) Times a Day. 10/4/23  Yes Karthikeyan Marte MD   diclofenac (VOLTAREN) 75 MG EC tablet Take 1 tablet by mouth 2 (Two) Times a Day. 9/14/23  Yes Karthikeyan Marte MD   fluticasone (FLONASE) 50 MCG/ACT nasal spray 2 sprays into the nostril(s) as directed by provider Daily As Needed for Rhinitis or Allergies.   Yes Maria E Verdin MD   furosemide (LASIX) 20 MG tablet Take 1 tablet by mouth Daily. 6/13/23  Yes Maria E Verdin MD   isosorbide mononitrate (IMDUR) 60 MG 24 hr tablet Take 1 tablet by mouth Daily. 10/17/23  Yes Karthikeyan Marte MD   melatonin 5 MG tablet tablet Take 1 tablet by mouth.   Yes Maria E Verdin MD   omeprazole (priLOSEC) 20 MG capsule Take 1  "capsule by mouth Daily.   Yes Provider, MD Maria E   ondansetron (ZOFRAN) 4 MG tablet Take 1 tablet by mouth Daily. 1/31/24  Yes Karthikeyan Marte MD   vitamin D (ERGOCALCIFEROL) 1.25 MG (97763 UT) capsule capsule Take 1 capsule by mouth Every 7 (Seven) Days. 10/4/23  Yes Karthikeyan Marte MD       AYANNA:        PHQ-9 Depression Screening  Little interest or pleasure in doing things? 0-->not at all   Feeling down, depressed, or hopeless? 0-->not at all   Trouble falling or staying asleep, or sleeping too much?     Feeling tired or having little energy?     Poor appetite or overeating?     Feeling bad about yourself - or that you are a failure or have let yourself or your family down?     Trouble concentrating on things, such as reading the newspaper or watching television?     Moving or speaking so slowly that other people could have noticed? Or the opposite - being so fidgety or restless that you have been moving around a lot more than usual?     Thoughts that you would be better off dead, or of hurting yourself in some way?     PHQ-9 Total Score 0   If you checked off any problems, how difficult have these problems made it for you to do your work, take care of things at home, or get along with other people?         PHQ-9 Total Score: 0   0 (Negative screening for depression)      Review of systems   negative unless otherwise specified above in HPI    Objective     Vital Signs: /89 (BP Location: Left arm, Patient Position: Sitting, Cuff Size: Adult)   Pulse 81   Temp 100.4 °F (38 °C) (Temporal)   Resp 18   Ht 170.2 cm (67\")   Wt 98.6 kg (217 lb 6.4 oz)   SpO2 97%   BMI 34.05 kg/m²     Physical Exam  Vitals and nursing note reviewed.   Constitutional:       General: He is not in acute distress.     Appearance: Normal appearance.   HENT:      Head: Normocephalic.   Eyes:      Extraocular Movements: Extraocular movements intact.      Pupils: Pupils are equal, round, and reactive to " light.   Cardiovascular:      Rate and Rhythm: Normal rate and regular rhythm.      Heart sounds: Normal heart sounds. No murmur heard.  Pulmonary:      Effort: Pulmonary effort is normal. No respiratory distress.      Breath sounds: Normal breath sounds. No rhonchi or rales.   Abdominal:      General: Abdomen is flat. Bowel sounds are normal.      Palpations: Abdomen is soft.   Neurological:      General: No focal deficit present.      Mental Status: He is alert.                Results Reviewed:  Glucose   Date Value Ref Range Status   03/07/2024 90 65 - 99 mg/dL Final   04/13/2022 97 74 - 109 mg/dL Final     BUN   Date Value Ref Range Status   03/07/2024 26 (H) 8 - 23 mg/dL Final   04/13/2022 13 6 - 20 mg/dL Final     Creatinine   Date Value Ref Range Status   03/07/2024 1.08 0.76 - 1.27 mg/dL Final   04/13/2022 0.8 0.5 - 1.2 mg/dL Final     Sodium   Date Value Ref Range Status   03/07/2024 141 136 - 145 mmol/L Final   04/13/2022 136 136 - 145 mmol/L Final     Potassium   Date Value Ref Range Status   03/07/2024 5.1 3.5 - 5.2 mmol/L Final   04/13/2022 4.5 3.5 - 5.0 mmol/L Final     Chloride   Date Value Ref Range Status   03/07/2024 104 98 - 107 mmol/L Final   04/13/2022 99 98 - 111 mmol/L Final     CO2   Date Value Ref Range Status   03/07/2024 26.0 22.0 - 29.0 mmol/L Final   04/13/2022 23 22 - 29 mmol/L Final     Calcium   Date Value Ref Range Status   03/07/2024 8.7 8.6 - 10.5 mg/dL Final   04/13/2022 8.9 8.6 - 10.0 mg/dL Final     ALT (SGPT)   Date Value Ref Range Status   09/14/2023 17 0 - 44 IU/L Final   04/13/2022 22 5 - 41 U/L Final     AST (SGOT)   Date Value Ref Range Status   09/14/2023 11 0 - 40 IU/L Final   04/13/2022 13 5 - 40 U/L Final     WBC   Date Value Ref Range Status   03/07/2024 13.11 (H) 3.40 - 10.80 10*3/mm3 Final   09/14/2023 14.1 (H) 3.4 - 10.8 x10E3/uL Final   12/05/2022 14.3 (H) 4.8 - 10.8 K/uL Final     Hematocrit   Date Value Ref Range Status   03/07/2024 38.0 37.5 - 51.0 % Final    12/05/2022 46.8 42.0 - 52.0 % Final     Platelets   Date Value Ref Range Status   03/07/2024 385 140 - 450 10*3/mm3 Final   12/05/2022 384 130 - 400 K/uL Final     Triglycerides   Date Value Ref Range Status   09/14/2023 86 0 - 149 mg/dL Final   12/05/2022 100 0 - 149 mg/dL Final     HDL Cholesterol   Date Value Ref Range Status   09/14/2023 59 >39 mg/dL Final   12/05/2022 52 (L) 55 - 121 mg/dL Final     Comment:     VALUES>60 MG/DL ARE ASSOCIATED WITH A DECREASED RISK OF  ATHEROSCLEROTIC CARDIOVASCULAR DISEASE     LDL Cholesterol    Date Value Ref Range Status   12/05/2022 95 <100 mg/dL Final     Comment:     <100 MG/DL=OPITIMAL    100-129 MG/DL=DESIRABLE    130-159 MG/DL BORDERLINE=INCREASED RISK OF ATHEROSCLEROTIC  CARDIOVASCULAR DISEASE    > OR = 160 MG/DL=ASSOCIATED WITH AN INCREASE RISK OF  ATHEROSCLEROTIC CARDIOVASCULAR DISEASE     LDL Chol Calc (NIH)   Date Value Ref Range Status   09/14/2023 172 (H) 0 - 99 mg/dL Final     Hemoglobin A1C   Date Value Ref Range Status   12/05/2022 6.4 (H) 4.0 - 6.0 % Final     Comment:     HbA1c levels >6% are an indication of hyperglycemia during the preceding 2  to 3 months or longer.    HbA1c levels may reach 20% or higher in poorly controlled diabetes.  Therapeutic action is suggested at levels above 8%.    Diabetes patients with HbA1c levels below 7% meet the goal of the American  Diabetes Association.    HbA1c levels below the established reference range may indicate recent  episodes of hypoglycemia, the presence of Hb variants, or shortened lifetime  of erythrocytes.      The following data was reviewed by: Karthikeyan Marte MD on 03/14/2024:    Data reviewed : Radiologic studies lumbar spine xray        Assessment / Plan     Assessment/Plan:   Diagnosis Plan   1. Encounter for hepatitis C screening test for low risk patient  Hepatitis C Antibody      2. Elevated blood sugar        3. Borderline diabetes  Hemoglobin A1c    CBC & Differential    Comprehensive  Metabolic Panel    Lipid Panel    MicroAlbumin, Urine, Random - Urine, Clean Catch      4. Need for pneumococcal vaccination  Pneumococcal Conjugate Vaccine 20-Valent All      5. Need for RSV immunization  Arexvy RSV Vaccine (60+ yrs)      6. Colon cancer screening  Cologuard - Stool, Per Rectum      7. Chronic midline low back pain with bilateral sciatica  XR Spine Lumbar 2 or 3 View (In Office)    ketorolac (TORADOL) injection 60 mg    methylPREDNISolone acetate (DEPO-medrol) injection 80 mg    TENS (Transcutaneous Electrical Nerve Stimulator)            Return for Annual physical. unless patient needs to be seen sooner or acute issues arise.      I have discussed the patient results/orders and and plan/recommendation with them at today's visit.      Signed by:    Karthikeyan Marte MD Date: 03/14/24

## 2024-03-16 LAB
ALBUMIN SERPL-MCNC: 4.4 G/DL (ref 3.8–4.9)
ALBUMIN/GLOB SERPL: 1.6 {RATIO} (ref 1.2–2.2)
ALP SERPL-CCNC: 116 IU/L (ref 44–121)
ALT SERPL-CCNC: 21 IU/L (ref 0–44)
AST SERPL-CCNC: 12 IU/L (ref 0–40)
BASOPHILS # BLD AUTO: 0.2 X10E3/UL (ref 0–0.2)
BASOPHILS NFR BLD AUTO: 1 %
BILIRUB SERPL-MCNC: 0.4 MG/DL (ref 0–1.2)
BUN SERPL-MCNC: 18 MG/DL (ref 8–27)
BUN/CREAT SERPL: 18 (ref 10–24)
CALCIUM SERPL-MCNC: 9.6 MG/DL (ref 8.6–10.2)
CHLORIDE SERPL-SCNC: 96 MMOL/L (ref 96–106)
CHOLEST SERPL-MCNC: 177 MG/DL (ref 100–199)
CO2 SERPL-SCNC: 23 MMOL/L (ref 20–29)
CREAT SERPL-MCNC: 0.99 MG/DL (ref 0.76–1.27)
EGFRCR SERPLBLD CKD-EPI 2021: 87 ML/MIN/1.73
EOSINOPHIL # BLD AUTO: 0.4 X10E3/UL (ref 0–0.4)
EOSINOPHIL NFR BLD AUTO: 2 %
ERYTHROCYTE [DISTWIDTH] IN BLOOD BY AUTOMATED COUNT: 15.6 % (ref 11.6–15.4)
GLOBULIN SER CALC-MCNC: 2.8 G/DL (ref 1.5–4.5)
GLUCOSE SERPL-MCNC: 93 MG/DL (ref 70–99)
HBA1C MFR BLD: 6.2 % (ref 4.8–5.6)
HCT VFR BLD AUTO: 37.7 % (ref 37.5–51)
HCV IGG SERPL QL IA: NON REACTIVE
HDLC SERPL-MCNC: 61 MG/DL
HGB BLD-MCNC: 11.9 G/DL (ref 13–17.7)
IMM GRANULOCYTES # BLD AUTO: 0.1 X10E3/UL (ref 0–0.1)
IMM GRANULOCYTES NFR BLD AUTO: 1 %
LDLC SERPL CALC-MCNC: 92 MG/DL (ref 0–99)
LYMPHOCYTES # BLD AUTO: 3.7 X10E3/UL (ref 0.7–3.1)
LYMPHOCYTES NFR BLD AUTO: 24 %
MCH RBC QN AUTO: 26.4 PG (ref 26.6–33)
MCHC RBC AUTO-ENTMCNC: 31.6 G/DL (ref 31.5–35.7)
MCV RBC AUTO: 84 FL (ref 79–97)
MICROALBUMIN UR-MCNC: <3 UG/ML
MONOCYTES # BLD AUTO: 1 X10E3/UL (ref 0.1–0.9)
MONOCYTES NFR BLD AUTO: 6 %
NEUTROPHILS # BLD AUTO: 9.9 X10E3/UL (ref 1.4–7)
NEUTROPHILS NFR BLD AUTO: 66 %
PLATELET # BLD AUTO: 367 X10E3/UL (ref 150–450)
POTASSIUM SERPL-SCNC: 4.4 MMOL/L (ref 3.5–5.2)
PROT SERPL-MCNC: 7.2 G/DL (ref 6–8.5)
RBC # BLD AUTO: 4.51 X10E6/UL (ref 4.14–5.8)
SODIUM SERPL-SCNC: 136 MMOL/L (ref 134–144)
TRIGL SERPL-MCNC: 135 MG/DL (ref 0–149)
VLDLC SERPL CALC-MCNC: 24 MG/DL (ref 5–40)
WBC # BLD AUTO: 15.2 X10E3/UL (ref 3.4–10.8)

## 2024-04-01 DIAGNOSIS — R19.5 POSITIVE COLORECTAL CANCER SCREENING USING COLOGUARD TEST: Primary | ICD-10-CM

## 2024-04-11 ENCOUNTER — OFFICE VISIT (OUTPATIENT)
Dept: INTERNAL MEDICINE | Facility: CLINIC | Age: 59
End: 2024-04-11
Payer: COMMERCIAL

## 2024-04-11 VITALS
OXYGEN SATURATION: 94 % | WEIGHT: 220 LBS | HEIGHT: 67 IN | TEMPERATURE: 97.5 F | SYSTOLIC BLOOD PRESSURE: 149 MMHG | HEART RATE: 63 BPM | DIASTOLIC BLOOD PRESSURE: 89 MMHG | BODY MASS INDEX: 34.53 KG/M2

## 2024-04-11 DIAGNOSIS — I10 PRIMARY HYPERTENSION: Primary | ICD-10-CM

## 2024-04-11 PROCEDURE — 99213 OFFICE O/P EST LOW 20 MIN: CPT | Performed by: FAMILY MEDICINE

## 2024-04-11 RX ORDER — FUROSEMIDE 40 MG/1
40 TABLET ORAL DAILY
Qty: 30 TABLET | Refills: 11 | Status: SHIPPED | OUTPATIENT
Start: 2024-04-11

## 2024-04-11 NOTE — PROGRESS NOTES
Subjective     Chief Complaint   Patient presents with    Annual Exam       History of Present Illness    Juarez Wade is a 59 y.o. male who presents for a routine visit at this time.  Patient comes in for recheck of his blood pressure at this time blood pressure remains moderately elevated discussed alternatives for treatment due to lower heart rates instead of using beta-blockers we will increase his dosage of Lasix and see how well this works for him patient to report back with any improvements.      Past Medical History:   Past Medical History:   Diagnosis Date    Anxiety     Arrhythmia ?    Cardiac abnormality 3/7/2024    GERD (gastroesophageal reflux disease)     Hyperlipidemia     Hypertension     Sleep apnea 2005    Sustained SVT 1/5/2024    Vitamin D deficiency      Past Surgical History:  Past Surgical History:   Procedure Laterality Date    APPENDECTOMY      CARDIAC ABLATION  03/07/2024    completed on this admission    CARDIAC CATHETERIZATION      CARDIAC ELECTROPHYSIOLOGY PROCEDURE N/A 3/7/2024    Procedure: Ablation SVT;  Surgeon: Raul Goodman MD;  Location:  [facility] CATH INVASIVE LOCATION;  Service: Cardiovascular;  Laterality: N/A;    CHOLECYSTECTOMY      HERNIA REPAIR       Social History:  reports that he has been smoking cigarettes. He started smoking about 10 years ago. He has a 5 pack-year smoking history. He has never used smokeless tobacco. He reports current alcohol use of about 2.0 standard drinks of alcohol per week. He reports that he does not use drugs.    Family History: family history includes Cancer in his maternal uncle; Dementia in his mother; Heart disease in his mother; No Known Problems in his father.      Allergies:  Allergies   Allergen Reactions    Adhesive Tape Rash     Medications:  Prior to Admission medications    Medication Sig Start Date End Date Taking? Authorizing Provider   amLODIPine-benazepril (LOTREL) 10-40 MG per capsule Take 1 capsule by mouth Daily.  "3/8/24 3/8/25 Yes Raul Goodman MD   aspirin 81 MG EC tablet Take 1 tablet by mouth.   Yes ProviderMaria E MD   atorvastatin (LIPITOR) 80 MG tablet Take 1 tablet by mouth Daily. 9/14/23  Yes Karthikeyan Marte MD   busPIRone (BUSPAR) 7.5 MG tablet Take 0.5 tablets by mouth 2 (Two) Times a Day. 10/4/23  Yes Karthikeyan Marte MD   diclofenac (VOLTAREN) 75 MG EC tablet Take 1 tablet by mouth 2 (Two) Times a Day. 9/14/23  Yes Karthikeyan Marte MD   fluticasone (FLONASE) 50 MCG/ACT nasal spray 2 sprays into the nostril(s) as directed by provider Daily As Needed for Rhinitis or Allergies.   Yes ProviderMaria E MD   furosemide (LASIX) 20 MG tablet Take 1 tablet by mouth Daily. 6/13/23  Yes Maria E Verdin MD   isosorbide mononitrate (IMDUR) 60 MG 24 hr tablet Take 1 tablet by mouth Daily. 10/17/23  Yes Karthikeyan Marte MD   melatonin 5 MG tablet tablet Take 1 tablet by mouth.   Yes ProviderMaria E MD   omeprazole (priLOSEC) 20 MG capsule Take 1 capsule by mouth Daily.   Yes ProviderMaria E MD   ondansetron (ZOFRAN) 4 MG tablet Take 1 tablet by mouth Daily. 1/31/24  Yes Karthikeyan Marte MD   vitamin D (ERGOCALCIFEROL) 1.25 MG (35573 UT) capsule capsule Take 1 capsule by mouth Every 7 (Seven) Days. 10/4/23  Yes Karthikeyan Marte MD             Review of systems   negative unless otherwise specified above in HPI    Objective     Vital Signs: /89 (BP Location: Left arm, Patient Position: Sitting, Cuff Size: Adult)   Pulse 63   Temp 97.5 °F (36.4 °C) (Infrared)   Ht 170.2 cm (67\")   Wt 99.8 kg (220 lb)   SpO2 94%   BMI 34.46 kg/m²     Physical Exam  Vitals and nursing note reviewed.   Constitutional:       General: He is not in acute distress.     Appearance: Normal appearance.   HENT:      Head: Normocephalic.   Eyes:      Extraocular Movements: Extraocular movements intact.      Pupils: Pupils are equal, round, and reactive to light. "   Cardiovascular:      Rate and Rhythm: Normal rate and regular rhythm.      Heart sounds: Normal heart sounds. No murmur heard.  Pulmonary:      Effort: Pulmonary effort is normal. No respiratory distress.      Breath sounds: Normal breath sounds. No rhonchi or rales.   Abdominal:      General: Abdomen is flat. Bowel sounds are normal.      Palpations: Abdomen is soft.   Neurological:      General: No focal deficit present.      Mental Status: He is alert.                Results Reviewed:  Glucose   Date Value Ref Range Status   03/13/2024 93 70 - 99 mg/dL Final   03/07/2024 90 65 - 99 mg/dL Final   04/13/2022 97 74 - 109 mg/dL Final     BUN   Date Value Ref Range Status   03/13/2024 18 8 - 27 mg/dL Final   03/07/2024 26 (H) 8 - 23 mg/dL Final   04/13/2022 13 6 - 20 mg/dL Final     Creatinine   Date Value Ref Range Status   03/13/2024 0.99 0.76 - 1.27 mg/dL Final   03/07/2024 1.08 0.76 - 1.27 mg/dL Final   04/13/2022 0.8 0.5 - 1.2 mg/dL Final     Sodium   Date Value Ref Range Status   03/13/2024 136 134 - 144 mmol/L Final   03/07/2024 141 136 - 145 mmol/L Final   04/13/2022 136 136 - 145 mmol/L Final     Potassium   Date Value Ref Range Status   03/13/2024 4.4 3.5 - 5.2 mmol/L Final   03/07/2024 5.1 3.5 - 5.2 mmol/L Final   04/13/2022 4.5 3.5 - 5.0 mmol/L Final     Chloride   Date Value Ref Range Status   03/13/2024 96 96 - 106 mmol/L Final   03/07/2024 104 98 - 107 mmol/L Final   04/13/2022 99 98 - 111 mmol/L Final     CO2   Date Value Ref Range Status   03/07/2024 26.0 22.0 - 29.0 mmol/L Final   04/13/2022 23 22 - 29 mmol/L Final     Total CO2   Date Value Ref Range Status   03/13/2024 23 20 - 29 mmol/L Final     Calcium   Date Value Ref Range Status   03/13/2024 9.6 8.6 - 10.2 mg/dL Final   03/07/2024 8.7 8.6 - 10.5 mg/dL Final   04/13/2022 8.9 8.6 - 10.0 mg/dL Final     ALT (SGPT)   Date Value Ref Range Status   03/13/2024 21 0 - 44 IU/L Final   04/13/2022 22 5 - 41 U/L Final     AST (SGOT)   Date Value Ref  Range Status   03/13/2024 12 0 - 40 IU/L Final   04/13/2022 13 5 - 40 U/L Final     WBC   Date Value Ref Range Status   03/13/2024 15.2 (H) 3.4 - 10.8 x10E3/uL Final   12/05/2022 14.3 (H) 4.8 - 10.8 K/uL Final     Hematocrit   Date Value Ref Range Status   03/13/2024 37.7 37.5 - 51.0 % Final   03/07/2024 38.0 37.5 - 51.0 % Final   12/05/2022 46.8 42.0 - 52.0 % Final     Platelets   Date Value Ref Range Status   03/13/2024 367 150 - 450 x10E3/uL Final   03/07/2024 385 140 - 450 10*3/mm3 Final   12/05/2022 384 130 - 400 K/uL Final     Triglycerides   Date Value Ref Range Status   03/13/2024 135 0 - 149 mg/dL Final   12/05/2022 100 0 - 149 mg/dL Final     HDL Cholesterol   Date Value Ref Range Status   03/13/2024 61 >39 mg/dL Final   12/05/2022 52 (L) 55 - 121 mg/dL Final     Comment:     VALUES>60 MG/DL ARE ASSOCIATED WITH A DECREASED RISK OF  ATHEROSCLEROTIC CARDIOVASCULAR DISEASE     LDL Cholesterol    Date Value Ref Range Status   12/05/2022 95 <100 mg/dL Final     Comment:     <100 MG/DL=OPITIMAL    100-129 MG/DL=DESIRABLE    130-159 MG/DL BORDERLINE=INCREASED RISK OF ATHEROSCLEROTIC  CARDIOVASCULAR DISEASE    > OR = 160 MG/DL=ASSOCIATED WITH AN INCREASE RISK OF  ATHEROSCLEROTIC CARDIOVASCULAR DISEASE     LDL Chol Calc (NIH)   Date Value Ref Range Status   03/13/2024 92 0 - 99 mg/dL Final     Hemoglobin A1C   Date Value Ref Range Status   03/13/2024 6.2 (H) 4.8 - 5.6 % Final     Comment:              Prediabetes: 5.7 - 6.4           Diabetes: >6.4           Glycemic control for adults with diabetes: <7.0     12/05/2022 6.4 (H) 4.0 - 6.0 % Final     Comment:     HbA1c levels >6% are an indication of hyperglycemia during the preceding 2  to 3 months or longer.    HbA1c levels may reach 20% or higher in poorly controlled diabetes.  Therapeutic action is suggested at levels above 8%.    Diabetes patients with HbA1c levels below 7% meet the goal of the American  Diabetes Association.    HbA1c levels below the  established reference range may indicate recent  episodes of hypoglycemia, the presence of Hb variants, or shortened lifetime  of erythrocytes.      The following data was reviewed by: Karthikeyan Marte MD on 03/14/2024:    Data reviewed : Radiologic studies lumbar spine xray        Assessment / Plan     Assessment/Plan:   Diagnosis Plan   1. Primary hypertension  furosemide (LASIX) 40 MG tablet            Return in about 6 months (around 10/11/2024) for Recheck. unless patient needs to be seen sooner or acute issues arise.      I have discussed the patient results/orders and and plan/recommendation with them at today's visit.      Signed by:    Karthikeyan Marte MD Date: 04/11/24

## 2024-04-17 ENCOUNTER — TELEPHONE (OUTPATIENT)
Dept: INTERNAL MEDICINE | Facility: CLINIC | Age: 59
End: 2024-04-17
Payer: COMMERCIAL

## 2024-05-07 ENCOUNTER — OFFICE VISIT (OUTPATIENT)
Dept: INTERNAL MEDICINE | Facility: CLINIC | Age: 59
End: 2024-05-07
Payer: COMMERCIAL

## 2024-05-07 VITALS
SYSTOLIC BLOOD PRESSURE: 153 MMHG | WEIGHT: 216 LBS | HEART RATE: 83 BPM | OXYGEN SATURATION: 96 % | TEMPERATURE: 99.3 F | BODY MASS INDEX: 33.9 KG/M2 | HEIGHT: 67 IN | DIASTOLIC BLOOD PRESSURE: 88 MMHG

## 2024-05-07 DIAGNOSIS — R19.5 LOOSE STOOLS: ICD-10-CM

## 2024-05-07 DIAGNOSIS — J06.9 UPPER RESPIRATORY TRACT INFECTION, UNSPECIFIED TYPE: Primary | ICD-10-CM

## 2024-05-07 DIAGNOSIS — I10 PRIMARY HYPERTENSION: ICD-10-CM

## 2024-05-07 LAB
EXPIRATION DATE: NORMAL
EXPIRATION DATE: NORMAL
FLUAV AG NPH QL: NEGATIVE
FLUBV AG NPH QL: NEGATIVE
INTERNAL CONTROL: NORMAL
INTERNAL CONTROL: NORMAL
Lab: NORMAL
Lab: NORMAL
SARS-COV-2 AG UPPER RESP QL IA.RAPID: NOT DETECTED

## 2024-05-07 PROCEDURE — 99213 OFFICE O/P EST LOW 20 MIN: CPT | Performed by: FAMILY MEDICINE

## 2024-05-07 PROCEDURE — 87804 INFLUENZA ASSAY W/OPTIC: CPT | Performed by: FAMILY MEDICINE

## 2024-05-07 PROCEDURE — 87426 SARSCOV CORONAVIRUS AG IA: CPT | Performed by: FAMILY MEDICINE

## 2024-05-07 RX ORDER — BENZONATATE 200 MG/1
200 CAPSULE ORAL 3 TIMES DAILY PRN
Qty: 21 CAPSULE | Refills: 0 | Status: SHIPPED | OUTPATIENT
Start: 2024-05-07 | End: 2024-05-14

## 2024-05-07 RX ORDER — LOPERAMIDE HYDROCHLORIDE 2 MG/1
2 CAPSULE ORAL 4 TIMES DAILY PRN
Status: SHIPPED | OUTPATIENT
Start: 2024-05-07

## 2024-05-07 RX ORDER — DOXYCYCLINE HYCLATE 100 MG/1
100 CAPSULE ORAL 2 TIMES DAILY
Qty: 14 CAPSULE | Refills: 0 | Status: SHIPPED | OUTPATIENT
Start: 2024-05-07 | End: 2024-05-14

## 2024-06-17 RX ORDER — ONDANSETRON 4 MG/1
4 TABLET, FILM COATED ORAL DAILY
Qty: 20 TABLET | Refills: 5 | Status: CANCELLED | OUTPATIENT
Start: 2024-06-17

## 2024-08-26 DIAGNOSIS — M54.41 ACUTE MIDLINE LOW BACK PAIN WITH BILATERAL SCIATICA: ICD-10-CM

## 2024-08-26 DIAGNOSIS — M54.42 ACUTE MIDLINE LOW BACK PAIN WITH BILATERAL SCIATICA: ICD-10-CM

## 2024-08-26 DIAGNOSIS — I50.812 CHRONIC RIGHT-SIDED CONGESTIVE HEART FAILURE: ICD-10-CM

## 2024-08-26 RX ORDER — ATORVASTATIN CALCIUM 80 MG/1
80 TABLET, FILM COATED ORAL DAILY
Qty: 90 TABLET | Refills: 3 | Status: SHIPPED | OUTPATIENT
Start: 2024-08-26

## 2024-08-26 RX ORDER — DICLOFENAC SODIUM 75 MG/1
75 TABLET, DELAYED RELEASE ORAL 2 TIMES DAILY
Qty: 60 TABLET | Refills: 11 | Status: SHIPPED | OUTPATIENT
Start: 2024-08-26

## 2024-09-04 RX ORDER — ERGOCALCIFEROL 1.25 MG/1
50000 CAPSULE, LIQUID FILLED ORAL
Qty: 12 CAPSULE | Refills: 3 | Status: SHIPPED | OUTPATIENT
Start: 2024-09-04

## 2024-10-01 RX ORDER — ISOSORBIDE MONONITRATE 60 MG/1
60 TABLET, EXTENDED RELEASE ORAL DAILY
Qty: 90 TABLET | Refills: 3 | Status: SHIPPED | OUTPATIENT
Start: 2024-10-01

## 2024-10-04 ENCOUNTER — OFFICE VISIT (OUTPATIENT)
Dept: INTERNAL MEDICINE | Facility: CLINIC | Age: 59
End: 2024-10-04
Payer: COMMERCIAL

## 2024-10-04 VITALS
OXYGEN SATURATION: 98 % | SYSTOLIC BLOOD PRESSURE: 145 MMHG | HEART RATE: 66 BPM | DIASTOLIC BLOOD PRESSURE: 79 MMHG | TEMPERATURE: 98.2 F | HEIGHT: 67 IN | WEIGHT: 217 LBS | RESPIRATION RATE: 17 BRPM | BODY MASS INDEX: 34.06 KG/M2

## 2024-10-04 DIAGNOSIS — H69.90 DYSFUNCTION OF EUSTACHIAN TUBE, UNSPECIFIED LATERALITY: Primary | ICD-10-CM

## 2024-10-04 DIAGNOSIS — Z23 INFLUENZA VACCINATION ADMINISTERED AT CURRENT VISIT: ICD-10-CM

## 2024-10-04 PROCEDURE — 90471 IMMUNIZATION ADMIN: CPT

## 2024-10-04 PROCEDURE — 90656 IIV3 VACC NO PRSV 0.5 ML IM: CPT

## 2024-10-04 PROCEDURE — 99213 OFFICE O/P EST LOW 20 MIN: CPT

## 2024-10-04 RX ORDER — FLUTICASONE PROPIONATE 50 UG/1
2 SPRAY, METERED NASAL DAILY PRN
Qty: 11.1 ML | Refills: 0 | Status: SHIPPED | OUTPATIENT
Start: 2024-10-04

## 2024-10-04 RX ORDER — METHYLPREDNISOLONE 4 MG
TABLET, DOSE PACK ORAL
Qty: 21 TABLET | Refills: 0 | Status: SHIPPED | OUTPATIENT
Start: 2024-10-04

## 2024-10-04 NOTE — PROGRESS NOTES
"        Subjective     Chief Complaint   Patient presents with    Ear Fullness     Right side       Ear Fullness   Associated symptoms include hearing loss.     History of Present Illness  The patient presents for evaluation of his ear.    He reports a persistent sensation in his ear, which he attributes to swimming over a month ago. Despite using ear drops for swimmers and wax, the issue persists. He notes that forceful exhalation seems to improve his hearing slightly, but he is uncertain if there is fluid accumulation in his ear. He also mentions some degree of hearing loss.    Supplemental Information  He used to have heart fluttering, but he had a stent placed and has not had any problems with it ever since.    Patient's PMR from outside medical facility reviewed and noted.    Review of Systems   HENT:  Positive for hearing loss.         \"Right ear feels full and like a base drum\"        Otherwise complete ROS reviewed and negative except as mentioned in the HPI.    Past Medical History:   Past Medical History:   Diagnosis Date    Anxiety     Arrhythmia ?    Cardiac abnormality 3/7/2024    GERD (gastroesophageal reflux disease)     Hyperlipidemia     Hypertension     Sleep apnea 2005    Sustained SVT 1/5/2024    Vitamin D deficiency      Past Surgical History:  Past Surgical History:   Procedure Laterality Date    APPENDECTOMY      CARDIAC ABLATION  03/07/2024    completed on this admission    CARDIAC CATHETERIZATION      CARDIAC ELECTROPHYSIOLOGY PROCEDURE N/A 3/7/2024    Procedure: Ablation SVT;  Surgeon: Raul Goodman MD;  Location: [facility] INVASIVE LOCATION;  Service: Cardiovascular;  Laterality: N/A;    CHOLECYSTECTOMY      HERNIA REPAIR       Social History:  reports that he has been smoking cigarettes. He started smoking about 11 years ago. He has a 5 pack-year smoking history. He has never used smokeless tobacco. He reports current alcohol use of about 2.0 standard drinks of alcohol per week. He " reports that he does not use drugs.    Family History: family history includes Cancer in his maternal uncle; Dementia in his mother; Heart disease in his mother; No Known Problems in his father.      Allergies:  Allergies   Allergen Reactions    Adhesive Tape Rash     Medications:  Prior to Admission medications    Medication Sig Start Date End Date Taking? Authorizing Provider   amLODIPine-benazepril (LOTREL) 10-40 MG per capsule Take 1 capsule by mouth Daily. 3/8/24 3/8/25 Yes Raul Goodman MD   aspirin 81 MG EC tablet Take 1 tablet by mouth.   Yes ProviderMaria E MD   atorvastatin (LIPITOR) 80 MG tablet Take 1 tablet by mouth Daily. 8/26/24  Yes Karthikeyan Marte MD   busPIRone (BUSPAR) 7.5 MG tablet Take 0.5 tablets by mouth 2 (Two) Times a Day. 10/4/23  Yes Karthikeyan Marte MD   diclofenac (VOLTAREN) 75 MG EC tablet Take 1 tablet by mouth 2 (Two) Times a Day. 8/26/24  Yes Karthikeyan Marte MD   fluticasone (FLONASE) 50 MCG/ACT nasal spray Administer 2 sprays into the nostril(s) as directed by provider Daily As Needed for Rhinitis or Allergies.   Yes Maria E Verdin MD   furosemide (LASIX) 40 MG tablet Take 1 tablet by mouth Daily. 4/11/24  Yes Karthikeyan Marte MD   isosorbide mononitrate (IMDUR) 60 MG 24 hr tablet Take 1 tablet by mouth Daily. 10/1/24  Yes Karthikeyan Marte MD   melatonin 5 MG tablet tablet Take 1 tablet by mouth.   Yes ProviderMaria E MD   omeprazole (priLOSEC) 20 MG capsule Take 1 capsule by mouth Daily.   Yes Maria E Verdin MD   ondansetron (ZOFRAN) 4 MG tablet Take 1 tablet by mouth Daily. 1/31/24  Yes Karthikeyan Marte MD   vitamin D (ERGOCALCIFEROL) 1.25 MG (16802 UT) capsule capsule Take 1 capsule by mouth Every 7 (Seven) Days. 9/4/24  Yes Karthikeyan Marte MD       AYANNA:        PHQ-9 Depression Screening  Little interest or pleasure in doing things?     Feeling down, depressed, or hopeless?     Trouble  "falling or staying asleep, or sleeping too much?     Feeling tired or having little energy?     Poor appetite or overeating?     Feeling bad about yourself - or that you are a failure or have let yourself or your family down?     Trouble concentrating on things, such as reading the newspaper or watching television?     Moving or speaking so slowly that other people could have noticed? Or the opposite - being so fidgety or restless that you have been moving around a lot more than usual?     Thoughts that you would be better off dead, or of hurting yourself in some way?     PHQ-9 Total Score     If you checked off any problems, how difficult have these problems made it for you to do your work, take care of things at home, or get along with other people?             Objective     Vital Signs: /79 (BP Location: Left arm, Patient Position: Sitting, Cuff Size: Adult)   Pulse 66   Temp 98.2 °F (36.8 °C) (Temporal)   Resp 17   Ht 170.2 cm (67\")   Wt 98.4 kg (217 lb)   SpO2 98%   BMI 33.99 kg/m²   Physical Exam  Vitals and nursing note reviewed.   Constitutional:       General: He is not in acute distress.     Appearance: Normal appearance. He is not ill-appearing.   HENT:      Head: Normocephalic and atraumatic.      Right Ear: External ear normal.      Left Ear: External ear normal.      Ears:      Comments: Healing right TM.  Right TM bulging.      Nose: Nose normal.      Mouth/Throat:      Mouth: Mucous membranes are moist.      Pharynx: No posterior oropharyngeal erythema.   Eyes:      General: No scleral icterus.     Extraocular Movements: Extraocular movements intact.      Conjunctiva/sclera: Conjunctivae normal.      Pupils: Pupils are equal, round, and reactive to light.   Cardiovascular:      Rate and Rhythm: Normal rate and regular rhythm.      Pulses: Normal pulses.      Heart sounds: Normal heart sounds.   Pulmonary:      Effort: Pulmonary effort is normal. No respiratory distress.      Breath " sounds: Normal breath sounds. No wheezing.   Abdominal:      General: Abdomen is flat. Bowel sounds are normal.      Palpations: Abdomen is soft.      Tenderness: There is no abdominal tenderness.   Musculoskeletal:         General: Normal range of motion.      Cervical back: Normal range of motion.      Right lower leg: No edema.      Left lower leg: No edema.   Skin:     General: Skin is warm and dry.      Findings: No erythema or rash.   Neurological:      General: No focal deficit present.      Mental Status: He is alert and oriented to person, place, and time. Mental status is at baseline.      Motor: No weakness.   Psychiatric:         Mood and Affect: Mood normal.         Behavior: Behavior normal.         Thought Content: Thought content normal.         Judgment: Judgment normal.           Advance Care Planning   ACP discussion was held with the patient during this visit.         Results Reviewed:  Glucose   Date Value Ref Range Status   03/13/2024 93 70 - 99 mg/dL Final   03/07/2024 90 65 - 99 mg/dL Final   04/13/2022 97 74 - 109 mg/dL Final     BUN   Date Value Ref Range Status   03/13/2024 18 8 - 27 mg/dL Final   03/07/2024 26 (H) 8 - 23 mg/dL Final   04/13/2022 13 6 - 20 mg/dL Final     Creatinine   Date Value Ref Range Status   03/13/2024 0.99 0.76 - 1.27 mg/dL Final   03/07/2024 1.08 0.76 - 1.27 mg/dL Final   04/13/2022 0.8 0.5 - 1.2 mg/dL Final     Sodium   Date Value Ref Range Status   03/13/2024 136 134 - 144 mmol/L Final   03/07/2024 141 136 - 145 mmol/L Final   04/13/2022 136 136 - 145 mmol/L Final     Potassium   Date Value Ref Range Status   03/13/2024 4.4 3.5 - 5.2 mmol/L Final   03/07/2024 5.1 3.5 - 5.2 mmol/L Final   04/13/2022 4.5 3.5 - 5.0 mmol/L Final     Chloride   Date Value Ref Range Status   03/13/2024 96 96 - 106 mmol/L Final   03/07/2024 104 98 - 107 mmol/L Final   04/13/2022 99 98 - 111 mmol/L Final     CO2   Date Value Ref Range Status   03/07/2024 26.0 22.0 - 29.0 mmol/L Final    04/13/2022 23 22 - 29 mmol/L Final     Total CO2   Date Value Ref Range Status   03/13/2024 23 20 - 29 mmol/L Final     Calcium   Date Value Ref Range Status   03/13/2024 9.6 8.6 - 10.2 mg/dL Final   03/07/2024 8.7 8.6 - 10.5 mg/dL Final   04/13/2022 8.9 8.6 - 10.0 mg/dL Final     ALT (SGPT)   Date Value Ref Range Status   03/13/2024 21 0 - 44 IU/L Final   04/13/2022 22 5 - 41 U/L Final     AST (SGOT)   Date Value Ref Range Status   03/13/2024 12 0 - 40 IU/L Final   04/13/2022 13 5 - 40 U/L Final     WBC   Date Value Ref Range Status   03/13/2024 15.2 (H) 3.4 - 10.8 x10E3/uL Final   12/05/2022 14.3 (H) 4.8 - 10.8 K/uL Final     Hematocrit   Date Value Ref Range Status   03/13/2024 37.7 37.5 - 51.0 % Final   03/07/2024 38.0 37.5 - 51.0 % Final   12/05/2022 46.8 42.0 - 52.0 % Final     Platelets   Date Value Ref Range Status   03/13/2024 367 150 - 450 x10E3/uL Final   03/07/2024 385 140 - 450 10*3/mm3 Final   12/05/2022 384 130 - 400 K/uL Final     Triglycerides   Date Value Ref Range Status   03/13/2024 135 0 - 149 mg/dL Final   12/05/2022 100 0 - 149 mg/dL Final     HDL Cholesterol   Date Value Ref Range Status   03/13/2024 61 >39 mg/dL Final   12/05/2022 52 (L) 55 - 121 mg/dL Final     Comment:     VALUES>60 MG/DL ARE ASSOCIATED WITH A DECREASED RISK OF  ATHEROSCLEROTIC CARDIOVASCULAR DISEASE     LDL Cholesterol    Date Value Ref Range Status   12/05/2022 95 <100 mg/dL Final     Comment:     <100 MG/DL=OPITIMAL    100-129 MG/DL=DESIRABLE    130-159 MG/DL BORDERLINE=INCREASED RISK OF ATHEROSCLEROTIC  CARDIOVASCULAR DISEASE    > OR = 160 MG/DL=ASSOCIATED WITH AN INCREASE RISK OF  ATHEROSCLEROTIC CARDIOVASCULAR DISEASE     LDL Chol Calc (NIH)   Date Value Ref Range Status   03/13/2024 92 0 - 99 mg/dL Final     Hemoglobin A1C   Date Value Ref Range Status   03/13/2024 6.2 (H) 4.8 - 5.6 % Final     Comment:              Prediabetes: 5.7 - 6.4           Diabetes: >6.4           Glycemic control for adults with  diabetes: <7.0     12/05/2022 6.4 (H) 4.0 - 6.0 % Final     Comment:     HbA1c levels >6% are an indication of hyperglycemia during the preceding 2  to 3 months or longer.    HbA1c levels may reach 20% or higher in poorly controlled diabetes.  Therapeutic action is suggested at levels above 8%.    Diabetes patients with HbA1c levels below 7% meet the goal of the American  Diabetes Association.    HbA1c levels below the established reference range may indicate recent  episodes of hypoglycemia, the presence of Hb variants, or shortened lifetime  of erythrocytes.          Assessment / Plan     Assessment/Plan:    1. Dysfunction of Eustachian tube, unspecified laterality  - methylPREDNISolone (MEDROL) 4 MG dose pack; Take as directed on package instructions.  Dispense: 21 tablet; Refill: 0  - fluticasone (FLONASE) 50 MCG/ACT nasal spray; Administer 2 sprays into the nostril(s) as directed by provider Daily As Needed for Rhinitis or Allergies.  Dispense: 11.1 mL; Refill: 0    2. Influenza vaccination administered at current visit  - Fluzone >6mos    Diagnoses and all orders for this visit:    1. Dysfunction of Eustachian tube, unspecified laterality (Primary)  -     methylPREDNISolone (MEDROL) 4 MG dose pack; Take as directed on package instructions.  Dispense: 21 tablet; Refill: 0  -     fluticasone (FLONASE) 50 MCG/ACT nasal spray; Administer 2 sprays into the nostril(s) as directed by provider Daily As Needed for Rhinitis or Allergies.  Dispense: 11.1 mL; Refill: 0    2. Influenza vaccination administered at current visit  -     Fluzone >6mos        Assessment & Plan  1. Eardrum rupture.  He ruptured his eardrum during a swimming incident over a month ago. The eardrum is currently healing, but there is fluid collection behind it. A course of steroids and intranasal steroid spray has been recommended to help drain the fluid. The potential side effects of steroids, including an increased heart rate, were discussed. He  was advised to monitor his heart rate and report any significant increases. The prescription will be sent to [facility] in [place].    2. Health Maintenance.  He requested and will receive his flu shot during this visit.      No follow-ups on file. unless patient needs to be seen sooner or acute issues arise.      I have discussed the patient results/orders and and plan/recommendation with them at today's visit.    Patient or patient representative verbalized consent for the use of Ambient Listening during the visit with  DARYA Olson for chart documentation. 10/10/2024  09:49 CDT  DARYA Olson   10/04/2024

## 2024-10-04 NOTE — LETTER
[facility]  Vaccine Consent Form    Patient Name:  Juarez Wade  Patient :  1965     Vaccine(s) Ordered    Fluzone >6mos        Screening Checklist  The following questions should be completed prior to vaccination. If you answer “yes” to any question, it does not necessarily mean you should not be vaccinated. It just means we may need to clarify or ask more questions. If a question is unclear, please ask your healthcare provider to explain it.    Yes No   Any fever or moderate to severe illness today (mild illness and/or antibiotic treatment are not contraindications)?     Do you have a history of a serious reaction to any previous vaccinations, such as anaphylaxis, encephalopathy within 7 days, Guillain-[place]a syndrome within 6 weeks, seizure?     Have you received any live vaccine(s) (e.g MMR, BALTAZAR) or any other vaccines in the last month (to ensure duplicate doses aren't given)?     Do you have an anaphylactic allergy to latex (DTaP, DTaP-IPV, Hep A, Hep B, MenB, RV, Td, Tdap), baker’s yeast (Hep B, HPV), polysorbates (RSV, nirsevimab, PCV 20, Rotavirrus, Tdap, Shingrix), or gelatin (BALTAZAR, MMR)?     Do you have an anaphylactic allergy to neomycin (Rabies, BALTAZAR, MMR, IPV, Hep A), polymyxin B (IPV), or streptomycin (IPV)?      Any cancer, leukemia, AIDS, or other immune system disorder? (BALTAZAR, MMR, RV)     Do you have a parent, brother, or sister with an immune system problem (if immune competence of vaccine recipient clinically verified, can proceed)? (MMR, BALTAZAR)     Any recent steroid treatments for >2 weeks, chemotherapy, or radiation treatment? (BALTAZAR, MMR)     Have you received antibody-containing blood transfusions or IVIG in the past 11 months (recommended interval is dependent on product)? (MMR, BALTAZAR)     Have you taken antiviral drugs (acyclovir, famciclovir, valacyclovir for BALTAZAR) in the last 24 or 48 hours, respectively?      Are you pregnant or planning to become pregnant within 1 month? (BALTAZAR, MMR,  "HPV, IPV, MenB, Abrexvy; For Hep B- refer to Engerix-B; For RSV - Abrysvo is indicated for 32-36 weeks of pregnancy from September to January)     For infants, have you ever been told your child has had intussusception or a medical emergency involving obstruction of the intestine (Rotavirus)? If not for an infant, can skip this question.         *Ordering Physicians/APC should be consulted if \"yes\" is checked by the patient or guardian above.  I have received, read, and understand the Vaccine Information Statement (VIS) for each vaccine ordered.  I have considered my or my child's health status as well as the health status of my close contacts.  I have taken the opportunity to discuss my vaccine questions with my or my child's health care provider.   I have requested that the ordered vaccine(s) be given to me or my child.  I understand the benefits and risks of the vaccines.  I understand that I should remain in the clinic for 15 minutes after receiving the vaccine(s).  _________________________________________________________  Signature of Patient or Parent/Legal Guardian ____________________  Date     "

## 2025-03-07 RX ORDER — AMLODIPINE AND BENAZEPRIL HYDROCHLORIDE 10; 40 MG/1; MG/1
1 CAPSULE ORAL DAILY
Qty: 30 CAPSULE | Refills: 0 | Status: SHIPPED | OUTPATIENT
Start: 2025-03-07

## 2025-04-15 ENCOUNTER — OFFICE VISIT (OUTPATIENT)
Dept: INTERNAL MEDICINE | Facility: CLINIC | Age: 60
End: 2025-04-15
Payer: COMMERCIAL

## 2025-04-15 VITALS
HEART RATE: 71 BPM | WEIGHT: 230 LBS | OXYGEN SATURATION: 97 % | SYSTOLIC BLOOD PRESSURE: 155 MMHG | DIASTOLIC BLOOD PRESSURE: 80 MMHG | TEMPERATURE: 98 F | BODY MASS INDEX: 36.1 KG/M2 | HEIGHT: 67 IN

## 2025-04-15 DIAGNOSIS — Z91.09 ENVIRONMENTAL ALLERGIES: ICD-10-CM

## 2025-04-15 DIAGNOSIS — I10 PRIMARY HYPERTENSION: ICD-10-CM

## 2025-04-15 DIAGNOSIS — Z00.00 ANNUAL PHYSICAL EXAM: ICD-10-CM

## 2025-04-15 DIAGNOSIS — E55.9 VITAMIN D DEFICIENCY: ICD-10-CM

## 2025-04-15 DIAGNOSIS — R53.82 CHRONIC FATIGUE: ICD-10-CM

## 2025-04-15 DIAGNOSIS — I50.812 CHRONIC RIGHT-SIDED CONGESTIVE HEART FAILURE: ICD-10-CM

## 2025-04-15 DIAGNOSIS — Z12.5 SCREENING PSA (PROSTATE SPECIFIC ANTIGEN): Primary | ICD-10-CM

## 2025-04-15 DIAGNOSIS — H69.90 DYSFUNCTION OF EUSTACHIAN TUBE, UNSPECIFIED LATERALITY: ICD-10-CM

## 2025-04-15 DIAGNOSIS — R73.03 BORDERLINE DIABETES: ICD-10-CM

## 2025-04-15 PROBLEM — K21.9 GASTROESOPHAGEAL REFLUX DISEASE WITHOUT ESOPHAGITIS: Status: ACTIVE | Noted: 2025-04-15

## 2025-04-15 RX ORDER — METHYLPREDNISOLONE 4 MG/1
TABLET ORAL
Qty: 21 TABLET | Refills: 0 | Status: SHIPPED | OUTPATIENT
Start: 2025-04-15 | End: 2025-04-29

## 2025-04-15 RX ORDER — FLUTICASONE PROPIONATE 50 MCG
2 SPRAY, SUSPENSION (ML) NASAL DAILY PRN
Qty: 16 G | Refills: 11 | Status: SHIPPED | OUTPATIENT
Start: 2025-04-15

## 2025-04-15 RX ORDER — AMLODIPINE AND BENAZEPRIL HYDROCHLORIDE 10; 40 MG/1; MG/1
1 CAPSULE ORAL DAILY
Qty: 30 CAPSULE | Refills: 11 | Status: SHIPPED | OUTPATIENT
Start: 2025-04-15

## 2025-04-15 RX ORDER — GUAIFENESIN 600 MG/1
600 TABLET, EXTENDED RELEASE ORAL 2 TIMES DAILY
Qty: 20 TABLET | Refills: 0 | Status: SHIPPED | OUTPATIENT
Start: 2025-04-15 | End: 2025-04-25

## 2025-04-15 RX ORDER — ATORVASTATIN CALCIUM 80 MG/1
80 TABLET, FILM COATED ORAL DAILY
Qty: 30 TABLET | Refills: 11 | Status: SHIPPED | OUTPATIENT
Start: 2025-04-15

## 2025-04-15 RX ORDER — FUROSEMIDE 40 MG/1
40 TABLET ORAL DAILY
Qty: 30 TABLET | Refills: 11 | Status: SHIPPED | OUTPATIENT
Start: 2025-04-15

## 2025-04-15 NOTE — PROGRESS NOTES
Subjective     Chief Complaint   Patient presents with    Annual Exam       History of Present Illness    Patient's PMR from outside medical facility reviewed and noted.    Juarez Wade is a 60 y.o. male who presents for an annual physical.  I discussed at preventative health care and cancer screening with him and its role in reducing types of cancer and other health problems appropriate for the patient's age and gender.  Also discussed vaccine status with the patient with current age related guidelines.  Patient understands the risks and benefits of preventative screening and immunization.  he has chosen to go ahead and obtain Annual wellness exam, CBC, CMP, TSH, Hba1c, Lipid profile, Hep C screening, and prostate cancer screening at this time.    Patient states he has had significant fatigue so we will also go ahead and recheck a testosterone level at this time.  Patient reports he has had hypergonadism in the past    Patient's blood pressure is significantly elevated today.  Patient states he had run out of his blood pressure medicine and does need refills of this at this time.    Patient is interested in weight loss and is a borderline diabetic we will go ahead and start him on some metformin to see if this will help.    Patient has some eustachian tube dysfunction he has a feeling of fullness in his right ear had previously had a rupture of that eardrum but this has resolved and has healed we will go ahead and start him on some Mucinex to see if this will clear the blockage    Juarez Wade  reports that he has been smoking cigarettes. He started smoking about 11 years ago. He has a 5 pack-year smoking history. He has never used smokeless tobacco. I have educated him on the risk of diseases from using tobacco products such as cancer, COPD, and heart disease.     I advised him to quit and he is not willing to quit.    I spent 5.5 minutes counseling the patient.               Past Medical History:    Past Medical History:   Diagnosis Date    Anxiety     Arrhythmia ?    Cardiac abnormality 3/7/2024    GERD (gastroesophageal reflux disease)     Hyperlipidemia     Hypertension     Sleep apnea 2005    Sustained SVT 1/5/2024    Vitamin D deficiency      Past Surgical History:  Past Surgical History:   Procedure Laterality Date    APPENDECTOMY      CARDIAC ABLATION  03/07/2024    completed on this admission    CARDIAC CATHETERIZATION      CARDIAC ELECTROPHYSIOLOGY PROCEDURE N/A 3/7/2024    Procedure: Ablation SVT;  Surgeon: Raul Goodman MD;  Location:  [facility];  Service: Cardiovascular;  Laterality: N/A;    CHOLECYSTECTOMY      HERNIA REPAIR       Social History:  reports that he has been smoking cigarettes. He started smoking about 11 years ago. He has a 5 pack-year smoking history. He has never used smokeless tobacco. He reports current alcohol use of about 2.0 standard drinks of alcohol per week. He reports that he does not use drugs.    Family History: family history includes Cancer in his maternal uncle; Dementia in his mother; Heart disease in his mother; No Known Problems in his father.      Allergies:  Allergies   Allergen Reactions    Adhesive Tape Rash     Medications:  Prior to Admission medications    Medication Sig Start Date End Date Taking? Authorizing Provider   amLODIPine-benazepril (LOTREL) 10-40 MG per capsule Take 1 capsule by mouth once daily 3/7/25  Yes Mauri Dawson APRN   aspirin 81 MG EC tablet Take 1 tablet by mouth.   Yes Provider, MD Maria E   atorvastatin (LIPITOR) 80 MG tablet Take 1 tablet by mouth Daily. 8/26/24  Yes Karthikeyan Marte MD   busPIRone (BUSPAR) 7.5 MG tablet Take 0.5 tablets by mouth 2 (Two) Times a Day. 10/4/23  Yes Karthikeyan Marte MD   diclofenac (VOLTAREN) 75 MG EC tablet Take 1 tablet by mouth 2 (Two) Times a Day. 8/26/24  Yes Karthikeyan Marte MD   fluticasone (FLONASE) 50 MCG/ACT nasal spray Administer 2 sprays  into the nostril(s) as directed by provider Daily As Needed for Rhinitis or Allergies. 10/4/24  Yes Mya Dickinson APRN   furosemide (LASIX) 40 MG tablet Take 1 tablet by mouth Daily. 4/11/24  Yes Karthikeyan Marte MD   isosorbide mononitrate (IMDUR) 60 MG 24 hr tablet Take 1 tablet by mouth Daily. 10/1/24  Yes Karthikeyan Marte MD   melatonin 5 MG tablet tablet Take 1 tablet by mouth.   Yes Maria E Verdin MD   methylPREDNISolone (MEDROL) 4 MG dose pack Take as directed on package instructions. 10/4/24  Yes Mya Dickinson APRN   omeprazole (priLOSEC) 20 MG capsule Take 1 capsule by mouth Daily.   Yes Maria E Verdin MD   ondansetron (ZOFRAN) 4 MG tablet Take 1 tablet by mouth Daily. 1/31/24  Yes Karthikeyan Marte MD   vitamin D (ERGOCALCIFEROL) 1.25 MG (77867 UT) capsule capsule Take 1 capsule by mouth Every 7 (Seven) Days. 9/4/24  Yes Karthikeyan Marte MD       AYANNA: Over the last two weeks, how often have you been bothered by the following problems?  Feeling nervous, anxious or on edge: Not at all  Not being able to stop or control worrying: Not at all  Worrying too much about different things: Not at all  Trouble Relaxing: Not at all  Being so restless that it is hard to sit still: Not at all  Becoming easily annoyed or irritable: Not at all  Feeling afraid as if something awful might happen: Not at all  AYANNA 7 Total Score: 0  If you checked any problems, how difficult have these problems made it for you to do your work, take care of things at home, or get along with other people: Not difficult at all    PHQ:  Little interest or pleasure in doing things? Not at all   Feeling down, depressed, or hopeless? Not at all   PHQ-2 Total Score 0         0 (Negative screening for depression)  Support given, observe for worsening symptoms        Review of systems   negative unless otherwise specified above in HPI    Objective     Vital Signs: /80   Pulse 71   Temp 98  "°F (36.7 °C) (Infrared)   Ht 170.2 cm (67\")   Wt 104 kg (230 lb)   SpO2 97%   BMI 36.02 kg/m²     Physical Exam  Vitals and nursing note reviewed.   Constitutional:       General: He is not in acute distress.     Appearance: Normal appearance.   HENT:      Head: Normocephalic.   Eyes:      Extraocular Movements: Extraocular movements intact.      Pupils: Pupils are equal, round, and reactive to light.   Cardiovascular:      Rate and Rhythm: Normal rate and regular rhythm.      Heart sounds: Normal heart sounds. No murmur heard.  Pulmonary:      Effort: Pulmonary effort is normal. No respiratory distress.      Breath sounds: Normal breath sounds. No rhonchi or rales.   Abdominal:      General: Abdomen is flat. Bowel sounds are normal.      Palpations: Abdomen is soft.   Neurological:      General: No focal deficit present.      Mental Status: He is alert.         Class 2 Severe Obesity (BMI >=35 and <=39.9). Obesity-related health conditions include the following: hypertension, coronary heart disease, GERD, and osteoarthritis. Obesity is newly identified. BMI is is above average; BMI management plan is completed. We discussed portion control and increasing exercise.      Results Reviewed:  Glucose   Date Value Ref Range Status   03/13/2024 93 70 - 99 mg/dL Final   03/07/2024 90 65 - 99 mg/dL Final   04/13/2022 97 74 - 109 mg/dL Final     BUN   Date Value Ref Range Status   03/13/2024 18 8 - 27 mg/dL Final   03/07/2024 26 (H) 8 - 23 mg/dL Final   04/13/2022 13 6 - 20 mg/dL Final     Creatinine   Date Value Ref Range Status   03/13/2024 0.99 0.76 - 1.27 mg/dL Final   03/07/2024 1.08 0.76 - 1.27 mg/dL Final   04/13/2022 0.8 0.5 - 1.2 mg/dL Final     Sodium   Date Value Ref Range Status   03/13/2024 136 134 - 144 mmol/L Final   03/07/2024 141 136 - 145 mmol/L Final   04/13/2022 136 136 - 145 mmol/L Final     Potassium   Date Value Ref Range Status   03/13/2024 4.4 3.5 - 5.2 mmol/L Final   03/07/2024 5.1 3.5 - 5.2 " mmol/L Final   04/13/2022 4.5 3.5 - 5.0 mmol/L Final     Chloride   Date Value Ref Range Status   03/13/2024 96 96 - 106 mmol/L Final   03/07/2024 104 98 - 107 mmol/L Final   04/13/2022 99 98 - 111 mmol/L Final     CO2   Date Value Ref Range Status   03/07/2024 26.0 22.0 - 29.0 mmol/L Final   04/13/2022 23 22 - 29 mmol/L Final     Total CO2   Date Value Ref Range Status   03/13/2024 23 20 - 29 mmol/L Final     Calcium   Date Value Ref Range Status   03/13/2024 9.6 8.6 - 10.2 mg/dL Final   03/07/2024 8.7 8.6 - 10.5 mg/dL Final   04/13/2022 8.9 8.6 - 10.0 mg/dL Final     ALT (SGPT)   Date Value Ref Range Status   03/13/2024 21 0 - 44 IU/L Final   04/13/2022 22 5 - 41 U/L Final     AST (SGOT)   Date Value Ref Range Status   03/13/2024 12 0 - 40 IU/L Final   04/13/2022 13 5 - 40 U/L Final     WBC   Date Value Ref Range Status   03/13/2024 15.2 (H) 3.4 - 10.8 x10E3/uL Final   12/05/2022 14.3 (H) 4.8 - 10.8 K/uL Final     Hematocrit   Date Value Ref Range Status   03/13/2024 37.7 37.5 - 51.0 % Final   03/07/2024 38.0 37.5 - 51.0 % Final   12/05/2022 46.8 42.0 - 52.0 % Final     Platelets   Date Value Ref Range Status   03/13/2024 367 150 - 450 x10E3/uL Final   03/07/2024 385 140 - 450 10*3/mm3 Final   12/05/2022 384 130 - 400 K/uL Final     Triglycerides   Date Value Ref Range Status   03/13/2024 135 0 - 149 mg/dL Final   12/05/2022 100 0 - 149 mg/dL Final     HDL Cholesterol   Date Value Ref Range Status   03/13/2024 61 >39 mg/dL Final   12/05/2022 52 (L) 55 - 121 mg/dL Final     Comment:     VALUES>60 MG/DL ARE ASSOCIATED WITH A DECREASED RISK OF  ATHEROSCLEROTIC CARDIOVASCULAR DISEASE     LDL Cholesterol    Date Value Ref Range Status   12/05/2022 95 <100 mg/dL Final     Comment:     <100 MG/DL=OPITIMAL    100-129 MG/DL=DESIRABLE    130-159 MG/DL BORDERLINE=INCREASED RISK OF ATHEROSCLEROTIC  CARDIOVASCULAR DISEASE    > OR = 160 MG/DL=ASSOCIATED WITH AN INCREASE RISK OF  ATHEROSCLEROTIC CARDIOVASCULAR DISEASE     LDL  Chol Calc (NIH)   Date Value Ref Range Status   03/13/2024 92 0 - 99 mg/dL Final     Hemoglobin A1C   Date Value Ref Range Status   03/13/2024 6.2 (H) 4.8 - 5.6 % Final     Comment:              Prediabetes: 5.7 - 6.4           Diabetes: >6.4           Glycemic control for adults with diabetes: <7.0     12/05/2022 6.4 (H) 4.0 - 6.0 % Final     Comment:     HbA1c levels >6% are an indication of hyperglycemia during the preceding 2  to 3 months or longer.    HbA1c levels may reach 20% or higher in poorly controlled diabetes.  Therapeutic action is suggested at levels above 8%.    Diabetes patients with HbA1c levels below 7% meet the goal of the American  Diabetes Association.    HbA1c levels below the established reference range may indicate recent  episodes of hypoglycemia, the presence of Hb variants, or shortened lifetime  of erythrocytes.              Procedure   Procedures       Assessment / Plan     Assessment/Plan:   Diagnosis Plan   1. Screening PSA (prostate specific antigen)  PSA Screen      2. Annual physical exam  Vitamin B12    Hemoglobin A1c    TSH    Lipid Panel    Comprehensive Metabolic Panel    CBC & Differential      3. Vitamin D deficiency  Vitamin D,25-Hydroxy      4. Borderline diabetes  metFORMIN (GLUCOPHAGE) 500 MG tablet      5. Primary hypertension  furosemide (LASIX) 40 MG tablet    amLODIPine-benazepril (LOTREL) 10-40 MG per capsule      6. Chronic right-sided congestive heart failure  atorvastatin (LIPITOR) 80 MG tablet      7. Environmental allergies  fluticasone (FLONASE) 50 MCG/ACT nasal spray      8. Dysfunction of Eustachian tube, unspecified laterality  guaiFENesin (Mucinex) 600 MG 12 hr tablet    methylPREDNISolone (MEDROL) 4 MG dose pack      9. Chronic fatigue  Testosterone            Return in about 6 months (around 10/15/2025) for Recheck. unless patient needs to be seen sooner or acute issues arise.      I have discussed the patient results/orders and and plan/recommendation  with them at today's visit.      Signed by:    Karthikeyan Marte MD Date: 04/15/25

## 2025-04-16 DIAGNOSIS — R97.20 ELEVATED PSA: Primary | ICD-10-CM

## 2025-04-16 LAB
25(OH)D3+25(OH)D2 SERPL-MCNC: 61.1 NG/ML (ref 30–100)
ALBUMIN SERPL-MCNC: 4.2 G/DL (ref 3.9–4.9)
ALP SERPL-CCNC: 152 IU/L (ref 44–121)
ALT SERPL-CCNC: 19 IU/L (ref 0–44)
AST SERPL-CCNC: 9 IU/L (ref 0–40)
BASOPHILS # BLD AUTO: 0.1 X10E3/UL (ref 0–0.2)
BASOPHILS NFR BLD AUTO: 1 %
BILIRUB SERPL-MCNC: <0.2 MG/DL (ref 0–1.2)
BUN SERPL-MCNC: 17 MG/DL (ref 8–27)
BUN/CREAT SERPL: 17 (ref 10–24)
CALCIUM SERPL-MCNC: 9.3 MG/DL (ref 8.6–10.2)
CHLORIDE SERPL-SCNC: 99 MMOL/L (ref 96–106)
CHOLEST SERPL-MCNC: 170 MG/DL (ref 100–199)
CO2 SERPL-SCNC: 20 MMOL/L (ref 20–29)
CREAT SERPL-MCNC: 0.99 MG/DL (ref 0.76–1.27)
EGFRCR SERPLBLD CKD-EPI 2021: 87 ML/MIN/1.73
EOSINOPHIL # BLD AUTO: 0.4 X10E3/UL (ref 0–0.4)
EOSINOPHIL NFR BLD AUTO: 3 %
ERYTHROCYTE [DISTWIDTH] IN BLOOD BY AUTOMATED COUNT: 16.1 % (ref 11.6–15.4)
GLOBULIN SER CALC-MCNC: 3 G/DL (ref 1.5–4.5)
GLUCOSE SERPL-MCNC: 137 MG/DL (ref 70–99)
HBA1C MFR BLD: 6.4 % (ref 4.8–5.6)
HCT VFR BLD AUTO: 32.8 % (ref 37.5–51)
HDLC SERPL-MCNC: 40 MG/DL
HGB BLD-MCNC: 9.6 G/DL (ref 13–17.7)
IMM GRANULOCYTES # BLD AUTO: 0.1 X10E3/UL (ref 0–0.1)
IMM GRANULOCYTES NFR BLD AUTO: 1 %
LDLC SERPL CALC-MCNC: 96 MG/DL (ref 0–99)
LYMPHOCYTES # BLD AUTO: 2.4 X10E3/UL (ref 0.7–3.1)
LYMPHOCYTES NFR BLD AUTO: 16 %
MCH RBC QN AUTO: 21.5 PG (ref 26.6–33)
MCHC RBC AUTO-ENTMCNC: 29.3 G/DL (ref 31.5–35.7)
MCV RBC AUTO: 73 FL (ref 79–97)
MONOCYTES # BLD AUTO: 1 X10E3/UL (ref 0.1–0.9)
MONOCYTES NFR BLD AUTO: 7 %
NEUTROPHILS # BLD AUTO: 10.5 X10E3/UL (ref 1.4–7)
NEUTROPHILS NFR BLD AUTO: 72 %
PLATELET # BLD AUTO: 588 X10E3/UL (ref 150–450)
POTASSIUM SERPL-SCNC: 4.9 MMOL/L (ref 3.5–5.2)
PROT SERPL-MCNC: 7.2 G/DL (ref 6–8.5)
PSA SERPL-MCNC: 5 NG/ML (ref 0–4)
RBC # BLD AUTO: 4.47 X10E6/UL (ref 4.14–5.8)
SODIUM SERPL-SCNC: 137 MMOL/L (ref 134–144)
TESTOST SERPL-MCNC: 172 NG/DL (ref 264–916)
TRIGL SERPL-MCNC: 196 MG/DL (ref 0–149)
TSH SERPL DL<=0.005 MIU/L-ACNC: 4.52 UIU/ML (ref 0.45–4.5)
VIT B12 SERPL-MCNC: 687 PG/ML (ref 232–1245)
VLDLC SERPL CALC-MCNC: 34 MG/DL (ref 5–40)
WBC # BLD AUTO: 14.5 X10E3/UL (ref 3.4–10.8)

## 2025-04-25 NOTE — PROGRESS NOTES
Subjective    Mr. Wade is 60 y.o. male    Chief Complaint: Elevated PSA    History of Present Illness  60-year-old male new patient with hypertension and diabetes referred for elevated PSA of 5.0 up from 2.1 last year.  He had a maternal uncle to have prostate cancer .  He denies bothersome LUTS, hematuria, or dysuria.  He does have longstanding greater than 5-year history of worsening organic erectile dysfunction refractory to all PDE inhibitor therapy in the past failing both Viagra and Cialis at maximal dosages.  He is now unable to obtain or maintain erection sufficient for penetration.    Lab Results   Component Value Date    PSA 5.0 (H) 04/15/2025    PSA 2.1 09/14/2023       The following portions of the patient's history were reviewed and updated as appropriate: allergies, current medications, past family history, past medical history, past social history, past surgical history and problem list.    Review of Systems      Current Outpatient Medications:     amLODIPine-benazepril (LOTREL) 10-40 MG per capsule, Take 1 capsule by mouth Daily., Disp: 30 capsule, Rfl: 11    aspirin 81 MG EC tablet, Take 1 tablet by mouth., Disp: , Rfl:     atorvastatin (LIPITOR) 80 MG tablet, Take 1 tablet by mouth Daily., Disp: 30 tablet, Rfl: 11    busPIRone (BUSPAR) 7.5 MG tablet, Take 0.5 tablets by mouth 2 (Two) Times a Day., Disp: 90 tablet, Rfl: 3    diclofenac (VOLTAREN) 75 MG EC tablet, Take 1 tablet by mouth 2 (Two) Times a Day., Disp: 60 tablet, Rfl: 11    fluticasone (FLONASE) 50 MCG/ACT nasal spray, Administer 2 sprays into the nostril(s) as directed by provider Daily As Needed for Rhinitis or Allergies., Disp: 16 g, Rfl: 11    furosemide (LASIX) 40 MG tablet, Take 1 tablet by mouth Daily., Disp: 30 tablet, Rfl: 11    isosorbide mononitrate (IMDUR) 60 MG 24 hr tablet, Take 1 tablet by mouth Daily., Disp: 90 tablet, Rfl: 3    melatonin 5 MG tablet tablet, Take 1 tablet by mouth., Disp: , Rfl:     metFORMIN  "(GLUCOPHAGE) 500 MG tablet, Take 1 tablet by mouth Daily With Breakfast., Disp: 30 tablet, Rfl: 11    omeprazole (priLOSEC) 20 MG capsule, Take 1 capsule by mouth Daily., Disp: , Rfl:     ondansetron (ZOFRAN) 4 MG tablet, Take 1 tablet by mouth Daily., Disp: 20 tablet, Rfl: 5    vitamin D (ERGOCALCIFEROL) 1.25 MG (43973 UT) capsule capsule, Take 1 capsule by mouth Every 7 (Seven) Days., Disp: 12 capsule, Rfl: 3    Past Medical History:   Diagnosis Date    Anxiety     Arrhythmia ?    Cardiac abnormality 3/7/2024    GERD (gastroesophageal reflux disease)     Hyperlipidemia     Hypertension     Sleep apnea     Sustained SVT 2024    Vitamin D deficiency        Past Surgical History:   Procedure Laterality Date    APPENDECTOMY      CARDIAC ABLATION  2024    completed on this admission    CARDIAC CATHETERIZATION      CARDIAC ELECTROPHYSIOLOGY PROCEDURE N/A 3/7/2024    Procedure: Ablation SVT;  Surgeon: Raul Goodman MD;  Location:  [facility];  Service: Cardiovascular;  Laterality: N/A;    CHOLECYSTECTOMY      HERNIA REPAIR         Social History     Socioeconomic History    Marital status:    Tobacco Use    Smoking status: Every Day     Current packs/day: 0.00     Average packs/day: 0.5 packs/day for 10.0 years (5.0 ttl pk-yrs)     Types: Cigarettes     Start date: 2013     Last attempt to quit: 2023     Years since quittin.6    Smokeless tobacco: Never   Vaping Use    Vaping status: Never Used   Substance and Sexual Activity    Alcohol use: Yes     Alcohol/week: 2.0 standard drinks of alcohol     Types: 2 Cans of beer per week     Comment: OCC    Drug use: Never    Sexual activity: Never       Family History   Problem Relation Age of Onset    Heart disease Mother     Dementia Mother     No Known Problems Father     Cancer Maternal Uncle        Objective    Temp 98.5 °F (36.9 °C)   Ht 167.6 cm (66\")   Wt 104 kg (230 lb)   BMI 37.12 kg/m²     Physical Exam  Penis and " testicles normal.      Results for orders placed or performed in visit on 05/01/25   POC Urinalysis Dipstick, Multipro    Collection Time: 05/01/25  1:05 PM    Specimen: Urine   Result Value Ref Range    Color Yellow Yellow, Straw, Dark Yellow, [place]    Clarity, UA Clear Clear    Glucose, UA Negative Negative mg/dL    Bilirubin Small (1+) (A) Negative    Ketones, UA Trace (A) Negative    Specific Gravity  1.025 1.005 - 1.030    Blood, UA Negative Negative    pH, Urine 5.5 5.0 - 8.0    Protein, POC 30 mg/dL (A) Negative mg/dL    Urobilinogen, UA Normal Normal, 0.2 E.U./dL    Nitrite, UA Negative Negative    Leukocytes Negative Negative     Assessment and Plan    Diagnoses and all orders for this visit:    1. Elevated prostate specific antigen (PSA) (Primary)  -     POC Urinalysis Dipstick, Multipro  -     PSA DIAGNOSTIC; Future    2. Erectile dysfunction due to arterial insufficiency    3. Primary hypertension      We had a long discussion regarding the natural history of elevated PSA along with potential causes including possible prostate cancer.  I recommended getting his PSA rechecked today to confirm his elevated PSA.  I will call him with these results.  We discussed options for PSA surveillance, prostate biopsy now, or getting prostate MRI for further evaluation.  He was given information today on other treatment options for his ED including MORGAN, penile injections, and  three-piece inflatable penile implant.      This document has been signed by YAO Alberts MD on May 1, 2025 14:20 CDT      Patient had repeat PSA level drawn last week in office came back elevated worsened at 5.8.  Patient declined rectal exam in office last week.  He would like to proceed with prostate MRI for further evaluation prior to prostate biopsy.  I will ask our office to call and get him scheduled for prostate MRI and I will call him with the result.      This document has been signed by YAO Alberts MD on May 5, 2025  12:11 CDT

## 2025-05-01 ENCOUNTER — OFFICE VISIT (OUTPATIENT)
Dept: UROLOGY | Facility: CLINIC | Age: 60
End: 2025-05-01
Payer: COMMERCIAL

## 2025-05-01 VITALS — BODY MASS INDEX: 36.96 KG/M2 | WEIGHT: 230 LBS | HEIGHT: 66 IN | TEMPERATURE: 98.5 F

## 2025-05-01 DIAGNOSIS — I10 PRIMARY HYPERTENSION: ICD-10-CM

## 2025-05-01 DIAGNOSIS — N52.01 ERECTILE DYSFUNCTION DUE TO ARTERIAL INSUFFICIENCY: ICD-10-CM

## 2025-05-01 DIAGNOSIS — R97.20 ELEVATED PROSTATE SPECIFIC ANTIGEN (PSA): Primary | ICD-10-CM

## 2025-05-01 LAB
BILIRUB BLD-MCNC: ABNORMAL MG/DL
CLARITY, POC: CLEAR
COLOR UR: YELLOW
GLUCOSE UR STRIP-MCNC: NEGATIVE MG/DL
KETONES UR QL: ABNORMAL
LEUKOCYTE EST, POC: NEGATIVE
NITRITE UR-MCNC: NEGATIVE MG/ML
PH UR: 5.5 [PH] (ref 5–8)
PROT UR STRIP-MCNC: ABNORMAL MG/DL
RBC # UR STRIP: NEGATIVE /UL
SP GR UR: 1.02 (ref 1–1.03)
UROBILINOGEN UR QL: NORMAL

## 2025-05-03 LAB — PSA SERPL-MCNC: 5.8 NG/ML (ref 0–4)

## 2025-05-05 ENCOUNTER — TELEPHONE (OUTPATIENT)
Dept: UROLOGY | Facility: CLINIC | Age: 60
End: 2025-05-05
Payer: COMMERCIAL

## 2025-05-05 NOTE — TELEPHONE ENCOUNTER
I called and informed the patient that his repeat PSA level came back higher at 5.8 for which he would like to proceed with prostate MRI for further evaluation.  I will call him with his prostate MRI result.

## 2025-05-20 ENCOUNTER — TELEPHONE (OUTPATIENT)
Dept: UROLOGY | Facility: CLINIC | Age: 60
End: 2025-05-20
Payer: COMMERCIAL

## 2025-05-22 ENCOUNTER — TELEPHONE (OUTPATIENT)
Dept: OTOLARYNGOLOGY | Facility: CLINIC | Age: 60
End: 2025-05-22

## 2025-05-22 ENCOUNTER — OFFICE VISIT (OUTPATIENT)
Dept: OTOLARYNGOLOGY | Facility: CLINIC | Age: 60
End: 2025-05-22
Payer: COMMERCIAL

## 2025-05-22 ENCOUNTER — TELEPHONE (OUTPATIENT)
Dept: OTOLARYNGOLOGY | Facility: CLINIC | Age: 60
End: 2025-05-22
Payer: COMMERCIAL

## 2025-05-22 ENCOUNTER — TELEPHONE (OUTPATIENT)
Dept: INTERNAL MEDICINE | Facility: CLINIC | Age: 60
End: 2025-05-22
Payer: COMMERCIAL

## 2025-05-22 VITALS
HEART RATE: 72 BPM | RESPIRATION RATE: 18 BRPM | HEIGHT: 66 IN | SYSTOLIC BLOOD PRESSURE: 119 MMHG | DIASTOLIC BLOOD PRESSURE: 75 MMHG | BODY MASS INDEX: 36.96 KG/M2 | WEIGHT: 230 LBS | TEMPERATURE: 98.2 F

## 2025-05-22 DIAGNOSIS — J02.9 PHARYNGITIS, UNSPECIFIED ETIOLOGY: ICD-10-CM

## 2025-05-22 DIAGNOSIS — R06.83 SNORING: ICD-10-CM

## 2025-05-22 DIAGNOSIS — Q38.2 MACROGLOSSIA: ICD-10-CM

## 2025-05-22 DIAGNOSIS — H90.A21 SENSORINEURAL HEARING LOSS (SNHL) OF RIGHT EAR WITH RESTRICTED HEARING OF LEFT EAR: ICD-10-CM

## 2025-05-22 DIAGNOSIS — R93.89 ABNORMAL CT SCAN: ICD-10-CM

## 2025-05-22 DIAGNOSIS — K21.9 LARYNGOPHARYNGEAL REFLUX (LPR): Primary | ICD-10-CM

## 2025-05-22 DIAGNOSIS — K14.8 TONGUE MASS: ICD-10-CM

## 2025-05-22 DIAGNOSIS — G47.33 OSA (OBSTRUCTIVE SLEEP APNEA): ICD-10-CM

## 2025-05-22 RX ORDER — CEFDINIR 300 MG/1
1 CAPSULE ORAL EVERY 12 HOURS SCHEDULED
COMMUNITY
Start: 2025-05-18

## 2025-05-22 RX ORDER — PREDNISONE 10 MG/1
10 TABLET ORAL DAILY
COMMUNITY
Start: 2025-05-18

## 2025-05-22 NOTE — PATIENT INSTRUCTIONS
" >NASAL SALINE:  Use 2 puffs each nostril 4-6 times daily and more frequently if possible.  You can buy saline spray or you can make your own and use an old spray bottle to administer  Use a humidifier at bedside  Recipe for saline:  Water                                 1 quart  Salt (table)                        1 tablespoon  Gylcerin (or Day Syrup)    1 teaspoon  Sodium bicarbonate           1 teaspoon  Sprays or Ora pots are recommended    Do not allow to stand for more than 24 hrs. Make new solution. There is no preservative in this solution.    Sinus irrigation and saline application can be enhanced with various over-the-counter products.  A WaterPik can be quite useful to irrigate, especially following sinus surgery.  Navage makes a product that irrigates the nose and some of the sinuses.  NeilMed makes a Sinu-Gator to irrigate the sinuses.  Neomed also has canned saline that we will come out under pressure.  A Ora pot can also be helpful.  All of these products help keep the nose clear of debris.  Please use as directed on the instructions that come with the particular device.     >NASAL STEROID use:  Using nasal steroids:  You will be prescribed one of the following nasal steroids: Flonase, Nasacort, Nasonex, Rhinocort, Qnasl, Zetonna  2 puffs each nostril 2 times daily  Start as soon as possible  If you are using Afrin for 3 days with the nasal steroid,  Use Afrin first and wait 10 minutes to allow the nose to open. Then administer nasal steroids.     THE PROBLEM OF ACID REFLUX    In BOTH children and adults, irritating acids may leak from the stomach and come up into the esophagus and throat. This can occur at any time, but occurs more commonly when lying down. When the acid touches the lining of the esophagus, there is irritation and muscle spasm, which can be felt up into the throat. Usually this is felt as \"heartburn\". When scarring of the esophagus occurs, the esophagus becomes less sensitive " and the symptoms may only be in the throat.     Some of the symptoms that can occur with acid reflux into the throat include coughing, soreness, burning, hoarseness, throat clearing, excessive mucous, bad taste in the mouth, bad breath, a sensation of a lump in the throat, wheezing, post-nasal drip, choking spells, bleeding and nausea. In a small percentage of people, more serious problems may result, such as pneumonia, ulcers in the larynx (voice box), reduced mobility of the vocal cords and a pouch in the upper esophagus (diverticulum). In children, the symptoms may be different and include persistent vomiting, bleeding from the esophagus, respiratory problems, pneumonia, asthma, choking spells, swallowing problems and anemia. In some cases, unexplained fussiness and crying is due to reflux.     The following instructions are designed to help neutralize the stomach acid, reduce the production of the acid, promote emptying of the acid from the stomach into the intestines and prevent acid from coming up into the esophagus. You should adopt enough of these changes to alleviate your symptoms. If carrying out these suggestions produces no change, it is imperative that you return so that we can discuss further the problem. More testing may be required, as might medication. It is important to realize that the esophagus takes time to heal, so you should not expect results immediately.    Diet  Most often, the throat symptoms above are due to dietary abuses. Certain foods are known to cause irritation, because they are acids themselves or cause excess production of stomach acid. These should be avoided, if possible. The following is a partial list of foods recognized as troublesome: coffee (even decaffeinated), tea chocolate, cola beverages, citrus beverages (such as 7-Up), caffeine containing beverages, alcohol, citrus fruits and juices, highly spiced foods, fatty foods, candies, nuts, mints, milk and milk products. Some  of the worst offenders for promoting stomach acid are milk and beer.     Hard candies, gum, breath fresheners, throat lozenges, cough drops, mouthwashes, gargles, may actually irritate the throat directly (many cough drops and lozenges contain irritants such as oil of eucalyptus and menthol), and can also stimulate acid production directly.     Large amounts of liquid in the diet tend to promote reflux, because liquids tend to come back up more easily than solids.     Meals should be bland and consist of real food, trying to avoid recreational food. Multiple small meals, rather than one or two large meals helps prevent reflux by not stretching the stomach and increasing the time needed for digestion, as well increasing the amount of acid needed to digest the meal. If you are overweight, losing weight is tremendously helpful.     YOU SHOULD NOT EAT FOR AT LEAST TWO HOURS BEFORE RETIRING AND YOU SHOULD REMAIN SITTING OR STANDING FOR AT LEAST 45 MINUTES AFTER EACH MEAL. This promotes passage of food from the stomach into the intestine.    Posture  Body weight is a significant factor in promoting reflux. Losing weight is beneficial. Pregnancy will markedly increase the symptoms of heartburn and throat pain. You should avoid clothing that fits tightly across the mid-section, as this promotes squeezing of the abdominal contents upward. It is helpful to practice abdominal or diaphragmatic breathing, using the stomach to push out each breath rather than chest expansion. Avoid slumping while sitting, and avoid stooping or straining.     For many, the reflux occurs at night while sleeping. This is the time acid production is the greatest and you are lying down, a position that promotes reflux, thus setting up the irritation that occurs the next morning. One of the most important things you can do is to elevate the head of the bed, in an effort to use gravity to keep the acid in the stomach. This is accomplished by placed  blocks or bricks beneath the legs at the head of the bed, raising the head 6-10 inches. Do not make the head so high that you slide down. Pillows are not effective because they cause the body to curl, increasing abdominal pressure and it is difficult to remain upright on them. Additionally, pillows promote sleeping on the back, but it is far more desirable to sleep on the right side or on the stomach, as this allows gas to escape, while preventing the escape of acid material. Children and infants, who are refluxing, should sleep on their stomachs.  Exercise  Regular exercise is extremely beneficial in promoting good digestion and regularity. The abdominal muscles are toned, which aids in controlling acid problems. However, it is recommended that you not participate in vigorous activity immediately after eating. This causes pressure on an already full stomach, forcing acid up into the esophagus. Regular exercise is encouraged, prior to meals, or two hours after meals.  Antacids  Antacids should be used regularly, as they neutralize excess acid. Gelusil II, Mylanta II, Tums and Rolaids are popular brands. Gaviscon is not an antacid, but floats on top of the stomach acid, preventing esophageal irritation. Care should be used in administering large doses of antacids, especially in children. Many antacid preparations contain magnesium, which promotes frequent bowel movements, while antacids that contain aluminum can be constipating. Tums have a high calcium content that can be used to some advantage in older women with reflux.     Antacid tablets are convenient, but the liquid form tends to work much more quickly. Also remember to check with your physician about interference with other medications. Antacids can slow the absorption of digitalis, Indocin, tetracyclines, fluorides and isoniazid from the intestines. Many medications require the presence of stomach acid to be absorbed.     Initially, antacids should be used  30 minutes after each meal, 2 hours after each meal and at bedtime. This maximizes the neutralization of the stomach acid. Antacids can be used more frequently if symptoms persist, but such extensive use needs to be reviewed with your physician.  Prescription Medications  If the above recommendations are not effectively resolving the symptoms, medications may be prescribed. These are usually in the form of acid production blockers, such as Tagamet, Zantac, Pepcid, or Axid or acid pump inhibitors like Prevacid, Nexium, Protonix or Prilosec. These drugs help stop acid production in the stomach. Medications such as Reglan can be used to promote stomach emptying.  Medications That Promote Reflux  Certain medications can promote acid reflux; either by relaxing the sphincter muscle that helps keeps stomach acid down, or increasing the acid production. These include progesterone (Provera, Ortho Novum, Ovral, other birth control pills), theophylline (Willie-Dur, etc.), anticholinergic (Donnatal, Scopolamine, Probanthine, Bentil, others), beta blockers (Inderal, Tenormin and Corgard), alpha blockers (Dibenzyline), dopamine, calcium channel blockers (Procardia, Cardizem, Isotin, Calan), aspirin, non-steroidal anti-inflammatory drugs (Motrin, Advil, Nuprin, Nalfon, Rufen, Indocin, Feldene, Clinoril and Tolectin). Vitamin C is an acid and can promote reflux. This is only a partial list and before stopping any prescription medication, you should check with your physician.    Goal  The goal is to reduce the symptoms with the least number of lifestyle changes. A combination of the above suggestions is frequently prescribed to return you to normal as quickly as possible. However, this problem did not develop overnight and will not disappear rapidly. Therefore, developing a regimen will aid in controlling symptoms and keep you symptom free for a long period of time. Other alternatives exist, should these suggestions fail, and can be  discussed with your physician.     To Summarize:  Watch your diet, avoid foods that cause acid reflux  Lose weight  Avoid tight fitting clothes  Adjust your posture, raise the head of the bed  Exercise regularly  Use antacids  Use prescription medication as directed  Avoid medications that promote reflux  Avoid behavior and eating habits that promote reflux of stomach acid     You are welcome to do a Google search on the topic of reflux and reflux diets. The internet has many useful resources.     Please remember, your success in controlling this condition depends on many factors including diet, exercise, medications and follow up. All are important and must be utilized to achieve success.      REFLUX MEDICATION USE:   Do Take:, Omeprazole/Prilosec, Zegerid, nightly      ANTACID USE:  yes  Use antacids 30 mins after every meal, 2 hours after every meal and at Bedtime  Use Gaviscon Extra (best), Tums, Rolaids, etc  Over the counter  Use 2 tsp or 2 tabs as the dose  Remember that some of these products contain Calcium or Aluminum. If you have dietary or medical issues, please inform physician    Stop Smoking    Diet  Exercise    See Primary care for Sleep apnea    Hearing test next visit    Call for problems       CONTACT INFORMATION:  The main office phone number is [phone]. For emergencies after hours and on weekends, this number will convert over to our answering service and the on call provider will answer. Please try to keep non emergent phone calls/ questions to office hours 9am-5pm Monday through Friday.     [employer]  As an alternative, you can sign up and use the Epic MyChart system for more direct and quicker access for non emergent questions/ problems.  [employer] allows you to send messages to your doctor, view your test results, renew your prescriptions, schedule appointments, and more. To sign up, go to [employer] and click on the Sign Up Now link in the New User? box. Enter  your [employer] Activation Code exactly as it appears below along with the last four digits of your Social Security Number and your Date of Birth () to complete the sign-up process. If you do not sign up before the expiration date, you must request a new code.    [employer] Activation Code: Activation code not generated  Current [employer] Status: Active    If you have questions, you can email [email or web address] or call [phone] to talk to our [employer] staff. Remember, [employer] is NOT to be used for urgent needs. For medical emergencies, dial 911.    IF YOU SMOKE, VAPE OR USE TOBACCO PLEASE READ THE FOLLOWING:  Why is smoking bad for me?  Smoking increases the risk of heart disease, lung disease, vascular disease, stroke, and cancer. If you smoke, STOP!      IF YOU SMOKE, VAPE OR USE TOBACCO PLEASE READ THE FOLLOWING:  Why is smoking bad for me?  Smoking increases the risk of heart disease, lung disease, vascular disease, stroke, and cancer. If you smoke, STOP!    For more information:  Quit Now [place]  -QUIT-NOW  [email or web address]

## 2025-05-22 NOTE — TELEPHONE ENCOUNTER
Spoke to patient wife, [facility] appointment today with Dr Saez at 1:45. She VU

## 2025-05-22 NOTE — TELEPHONE ENCOUNTER
Hub staff attempted to follow warm transfer process and was unsuccessful     Caller: BILLIE HASSAN    Relationship to patient: Emergency Contact    Best call back number: [phone] (home)      Patient is needing: PT WIFE CALLING TO SPEAK WITH PAUL. PLEASE CALL BACK TO SPEAK TO HER. THANK YOU.

## 2025-05-22 NOTE — TELEPHONE ENCOUNTER
Caller: DR HARRIS'S OFFICE    Relationship to patient: PROVIDER    Best call back number: [phone]    Chief complaint: RETURNING A CALL TO PAUL PLEASE REACH OUT WHEN AVAILABLE    HUB AGENT UNABLE TO WARM TRANSFER

## 2025-05-22 NOTE — TELEPHONE ENCOUNTER
Left patient wife VM to see if the patient can come in today to see Dr Saez about his tongue mass. Requested call back

## 2025-05-22 NOTE — TELEPHONE ENCOUNTER
FOR YOLI:  PAUL FROM ENT - [phone]  [phone]    DOES NOT HAVE ANYTHING DOCUMENTING THE PATIENT'S TONGUE LESIONS. SHE HAS LOOKED AT THE OFFICE NOTE AND DID NOT SEE ANYTHING EITHER.    PLEASE ADVISE, PATIENT HAS AN APPT TODAY AT ENT

## 2025-05-22 NOTE — TELEPHONE ENCOUNTER
Spoke with Ayaan at ENT, informed her we had no clue he had ref,  possible set up from [facility] Dr Sandoval's office?  Did not see any report about the Tongue lesion,   I offered the numbert to [facility] records dept, but she said she didn't need it.  They would see pt at this apt.

## 2025-05-22 NOTE — PROGRESS NOTES
"    Bijna Saez Jr, MD  [facility]  [address], [address]  [place] [address]  Fax [phone]  Phone [phone]      Visit Type: NEW PATIENT   Chief Complaint   Patient presents with    Oral Swelling     Sunday pt went to the ER and the right side of his neck, throat, and tongue was swollen. Pt states he could barely talk. They done a scan with and without dye and could see \"something\" there but wasn't sure if it was a swollen gland or lymph node.            HISTORY OBTAINED FROM: patient  Accompanied by:Wife  HPI  He complains of tongue swelling.   He says Sat nite had swollen Right neck. Was treated for swelling in ER.  Was treated for swollen gland.  Had CT in ER and films forwarded. No records avaiable.  He says symptoms resolved except for R tongue and throat sore.  Eaeing well. Has slight issue intermittentlywith swallowing.  Smoke- 1 ppd, x many years  Drink- occasionally.  Has history of Atul untreated since 2005.    History of Present Illness      Past Medical History:   Diagnosis Date    Anxiety     Arrhythmia ?    Cardiac abnormality 3/7/2024    GERD (gastroesophageal reflux disease)     Hyperlipidemia     Hypertension     Sleep apnea 2005    Sustained SVT 1/5/2024    Vitamin D deficiency        Past Surgical History:   Procedure Laterality Date    APPENDECTOMY      CARDIAC ABLATION  03/07/2024    completed on this admission    CARDIAC CATHETERIZATION      CARDIAC ELECTROPHYSIOLOGY PROCEDURE N/A 3/7/2024    Procedure: Ablation SVT;  Surgeon: Raul Goodman MD;  Location: [facility] INVASIVE LOC[facility];  Service: Cardiovascular;  Laterality: N/A;    CHOLECYSTECTOMY      HERNIA REPAIR         Family History: His family history includes Cancer in his maternal uncle; Dementia in his mother; Heart disease in his mother; No Known Problems in his father.     Social History: He  reports that he has been smoking cigarettes. He " started smoking about 11 years ago. He has a 5 pack-year smoking history. He has never used smokeless tobacco. He reports current alcohol use of about 2.0 standard drinks of alcohol per week. He reports that he does not use drugs.    Home Medications:  amLODIPine-benazepril, aspirin, atorvastatin, busPIRone, cefdinir, diclofenac, fluticasone, furosemide, isosorbide mononitrate, melatonin, metFORMIN, omeprazole, ondansetron, predniSONE, and vitamin D    Allergies:  He is allergic to adhesive tape.       Vital Signs:   Temp:  [98.2 °F (36.8 °C)] 98.2 °F (36.8 °C)  Heart Rate:  [72] 72  Resp:  [18] 18  BP: (119)/(75) 119/75  ENT Physical Exam  Constitutional  Appearance: patient appears well-developed, obesity noted, patient is cooperative;  Communication/Voice: communication appropriate for developmental age; vocal quality normal;  Head and Face  Appearance: head appears normal, face appears normal and face appears atraumatic;  Palpation: facial palpation normal;  Salivary: glands normal;  Head and Face comments: Full face  Ear  Hearing: impaired to conversational voice;  Auricles: bilateral auricles normal;  External Mastoids: right external mastoid normal; left external mastoid normal;  Ear Canals: bilateral ear canals normal;  Tympanic Membranes: bilateral tympanic membranes normal;  Nose  External Nose: nares patent bilaterally; external nose normal;  Oral Cavity/Oropharynx  Lips: normal;  Neck  Neck: large neck circumference present; neck palpation normal;  Thyroid: thyroid normal;  Neck comments: Adiposis  Very thick neck  Respiratory  Inspection: breathing unlabored; normal breathing rate;  Cardiovascular  Inspection: extremities are warm and well perfused; no peripheral edema present;  Lymphatic  Palpation: lymph nodes normal;  Neurovestibular  Mental Status: alert and oriented;  Psychiatric: mood normal; affect is appropriate;       Laryngoscopy    Date/Time: 5/22/2025 2:07 PM    Performed by: Bijan Saze  Xiao Davis MD  Authorized by: Bijan Saez Jr., MD    Consent:     Consent obtained:  Verbal    Consent given by:  Patient    Alternatives discussed:  No treatment  Anesthesia (see MAR for exact dosages):     Anesthesia method:  Topical application    Topical anesthetic:  Tetracaine  Procedure details:     Indications: assessment of airway and oncologic surveillance      Medication:  Afrin    Instrument: flexible fiberoptic laryngoscope      Scope location: left nare    Sinus/ Nasopharynx:     Right nasopharynx: patent and inflammation      Left nasopharynx: patent and inflammation      Right eustachian tube: patent      Left eustachian tube: inflammation, patent and inflammation    Oropharynx/ Supraglottis:     Posterior pharyngeal wall: inflamed      Oropharynx: inflammation      Oropharynx: no stenosis and no lesion      Vallecula: inflammation      Vallecula: no lesion      Base of tongue: lingual tonsillar hypertrophy (Moderate) and inflammation      Base of tongue: no lesion      Epiglottis: inflammation      Epiglottis: no lesions and not retroflexed    Larynx/ Hypopharynx:     Arytenoids: inflammation and interarytenoid edema      Arytenoids: no lesions      Hypopharynx: inflammation      Hypopharynx: no lesions      Pyriform sinus: inflammation      Pyriform sinus: no lesion and no pooling of secretions      False vocal cords: inflammation      False vocal cords: no lesion      True vocal cords: immobility and Leslie's edema    Post-procedure details:     Patient tolerance of procedure:  Tolerated well  Comments:      Generalized moderate inflammation from nasopharynx down to the hypopharynx, including the supraglottis and glottis  No evidence of lesions in the nasopharynx, oropharynx, hypopharynx, base of tongue, vallecula, epiglottis.  True vocal folds freely mobile with mild submucosal edema  Arytenoid towers mildly hypertrophied and erythematous  Interarytenoid area fairly erythematous      Result Review       RESULTS REVIEW    I have reviewed the patients old records in the chart.   I have reviewed the patients old records in the chart.  The following results/records were reviewed:  I reviewed the patient's new imaging results and agree with the interpretation.   CT Outside Head (05/18/2025 12:53)   No ER records available         Assessment & Plan  Laryngopharyngeal reflux (LPR)  Based on examination  Pharyngitis, unspecified etiology  Based on examination  Macroglossia  Acquired, contributing to sleep apnea  Abnormal CT scan  Right neck, right base of tongue area  Sensorineural hearing loss (SNHL) of right ear with restricted hearing of left ear  Requires further workup  Tongue mass  Right base of tongue, neck area  ELMA (obstructive sleep apnea)  Untreated  Snoring       Orders Placed This Encounter   Procedures    Flexible laryngoscopy    Comprehensive Hearing Test           Assessment & Plan      Conservative management.  Patient appears to have no mass today.  He has no mass in his neck.  I have reviewed the CT scan.  I do not have any records from his ER visit.  I also do not have the report on the CT scan.  Patient and his wife will try to get this to me at next visit.  Since I do not feel any mass, I will continue close observation.  The patient is at high risk because of his smoking.  He also appears to have reflux.  I will treat reflux, encourage smoking cessation.  He will continue Flonase and nasal saline.  I will obtain records.  I will see if he recurs.  I am unsure as to why he had swelling prior to his ER visit.  Patient gives additional history of prior eardrum perforation by primary care in the past.  His hearing is not recovered on the right side.  He has had no audiogram or workup.  I will obtain audiogram upon return to see if he has any hearing changes.  Audiogram at next visit  Flonase twice daily  Nasal saline  Reflux precautions  Prilosec at  bedtime  Antacids  Diet  Exercise  Obtain medical records  See primary care for sleep apnea follow-up  Call for problems    My Chart:  Patient is using My Chart    Patient, Spouse understand(s) and agree(s) with the treatment plan as described.      Return in about 6 weeks (around 7/3/2025) for Recheck Eas, audio, reflux, R BOT.        Electronically signed by Bijan Saez Jr, MD 05/22/25 2:18 PM CDT.

## 2025-05-22 NOTE — TELEPHONE ENCOUNTER
I spoke to Tegan from Dr Marte's office. She advised they have no report for a tongue lesion, they did not refer him, and they don't know anything about it. Will attempt to reach out to patient and have him bring the report from provider who referred him.

## 2025-05-23 ENCOUNTER — HOSPITAL ENCOUNTER (OUTPATIENT)
Dept: MRI IMAGING | Facility: HOSPITAL | Age: 60
Discharge: HOME | End: 2025-05-23
Admitting: UROLOGY
Payer: COMMERCIAL

## 2025-05-23 DIAGNOSIS — R97.20 ELEVATED PROSTATE SPECIFIC ANTIGEN (PSA): ICD-10-CM

## 2025-05-23 PROCEDURE — A9573 GADOPICLENOL 0.5 MMOL/ML SOLUTION: HCPCS | Performed by: UROLOGY

## 2025-05-23 PROCEDURE — 72197 MRI PELVIS W/O & W/DYE: CPT

## 2025-05-23 PROCEDURE — 25510000001 GADOPICLENOL 0.5 MMOL/ML SOLUTION: Performed by: UROLOGY

## 2025-05-23 RX ADMIN — GADOPICLENOL 10 ML: 485.1 INJECTION INTRAVENOUS at 07:56

## 2025-05-27 ENCOUNTER — TELEPHONE (OUTPATIENT)
Dept: UROLOGY | Facility: CLINIC | Age: 60
End: 2025-05-27
Payer: COMMERCIAL

## 2025-05-27 ENCOUNTER — PREP FOR SURGERY (OUTPATIENT)
Dept: OTHER | Facility: HOSPITAL | Age: 60
End: 2025-05-27
Payer: COMMERCIAL

## 2025-05-27 DIAGNOSIS — R97.20 ELEVATED PROSTATE SPECIFIC ANTIGEN (PSA): Primary | ICD-10-CM

## 2025-05-27 RX ORDER — MAGNESIUM HYDROXIDE 1200 MG/15ML
LIQUID ORAL
Qty: 1 EACH | Refills: 0 | Status: SHIPPED | OUTPATIENT
Start: 2025-05-27

## 2025-05-27 NOTE — TELEPHONE ENCOUNTER
I called and informed the patient that his prostate MRI showed a PI-RADS 5 lesion for which I recommended scheduling MRI ultrasound fusion prostate biopsy.  I will get him set up with Dr. Albert for perineal UroNav fusion prostate biopsy.  I instructed the patient to  a Fleet enema to use rectally the morning of the procedure.  I will ask our office to call him and get him scheduled with Dr. Albert.  He will stay on his baby aspirin given his cardiac history.  He voiced understanding and agreement.

## 2025-05-28 ENCOUNTER — TELEPHONE (OUTPATIENT)
Dept: UROLOGY | Facility: CLINIC | Age: 60
End: 2025-05-28
Payer: COMMERCIAL

## 2025-05-28 NOTE — TELEPHONE ENCOUNTER
Caller: Juarez Wade    Relationship to Patient: Self        Phone Number: [phone]     Reason for Call: PT IS CALLING BACK PLEASE GIVE HIM A CALL BACK WAS UNABLE TO TRANSFER

## 2025-05-28 NOTE — TELEPHONE ENCOUNTER
Called pt to let them know we scheduled their procedure for 6/11/2025. Spoke with pt.     We will call them two days before their procedure to let them know their arrival time. Also, will let the pt know they will need a  and should anticipate being there most of the day as surgery times do fluctuate.     Pt is taking the following blood thinners: baby aspirin and Voltaren. Pt should stop Voltaren 7 days prior to the procedure, but can continue the baby aspirin. Pt cannot eat or drink after midnight the night before the procedure and should use a fleet enema the night before the procedure.      Pt's pre-op testing is scheduled for 6/2/2025 at 8:00 am.      Pt will go to patient registration for both their pre-op testing and procedure.

## 2025-05-28 NOTE — TELEPHONE ENCOUNTER
Called pt to let them know we scheduled their procedure for 6/11/2025. Pt was not available. Left message for pt to call our office back.     We will call them two days before their procedure to let them know their arrival time. Also, will let the pt know they will need a  and should anticipate being there most of the day as surgery times do fluctuate.    Pt is taking the following blood thinners: baby aspirin and Voltaren. Pt should stop Voltaren 7 days prior to the procedure, but can continue the baby aspirin. Pt cannot eat or drink after midnight the night before the procedure and should use a fleet enema the night before the procedure.     Pt's pre-op testing is scheduled for 6/2/2025 at 8:00 am.     Pt will go to patient registration for both their pre-op testing and procedure.

## 2025-06-02 ENCOUNTER — PRE-ADMISSION TESTING (OUTPATIENT)
Dept: PREADMISSION TESTING | Facility: HOSPITAL | Age: 60
End: 2025-06-02
Payer: COMMERCIAL

## 2025-06-02 VITALS
DIASTOLIC BLOOD PRESSURE: 68 MMHG | OXYGEN SATURATION: 99 % | BODY MASS INDEX: 36.23 KG/M2 | RESPIRATION RATE: 16 BRPM | HEART RATE: 58 BPM | SYSTOLIC BLOOD PRESSURE: 165 MMHG | HEIGHT: 67 IN | WEIGHT: 230.82 LBS

## 2025-06-02 DIAGNOSIS — R97.20 ELEVATED PROSTATE SPECIFIC ANTIGEN (PSA): ICD-10-CM

## 2025-06-02 LAB
ANION GAP SERPL CALCULATED.3IONS-SCNC: 12 MMOL/L (ref 5–15)
BUN SERPL-MCNC: 23.3 MG/DL (ref 8–23)
BUN/CREAT SERPL: 21.2 (ref 7–25)
CALCIUM SPEC-SCNC: 9.2 MG/DL (ref 8.6–10.5)
CHLORIDE SERPL-SCNC: 100 MMOL/L (ref 98–107)
CO2 SERPL-SCNC: 24 MMOL/L (ref 22–29)
CREAT SERPL-MCNC: 1.1 MG/DL (ref 0.76–1.27)
DEPRECATED RDW RBC AUTO: 46.2 FL (ref 37–54)
EGFRCR SERPLBLD CKD-EPI 2021: 76.4 ML/MIN/1.73
ERYTHROCYTE [DISTWIDTH] IN BLOOD BY AUTOMATED COUNT: 18.6 % (ref 12.3–15.4)
GLUCOSE SERPL-MCNC: 101 MG/DL (ref 65–99)
HCT VFR BLD AUTO: 29.9 % (ref 37.5–51)
HGB BLD-MCNC: 8.7 G/DL (ref 13–17.7)
MCH RBC QN AUTO: 20.4 PG (ref 26.6–33)
MCHC RBC AUTO-ENTMCNC: 29.1 G/DL (ref 31.5–35.7)
MCV RBC AUTO: 70 FL (ref 79–97)
PLATELET # BLD AUTO: 537 10*3/MM3 (ref 140–450)
PMV BLD AUTO: 9.6 FL (ref 6–12)
POTASSIUM SERPL-SCNC: 4.6 MMOL/L (ref 3.5–5.2)
RBC # BLD AUTO: 4.27 10*6/MM3 (ref 4.14–5.8)
SODIUM SERPL-SCNC: 136 MMOL/L (ref 136–145)
WBC NRBC COR # BLD AUTO: 16.44 10*3/MM3 (ref 3.4–10.8)

## 2025-06-02 PROCEDURE — 36415 COLL VENOUS BLD VENIPUNCTURE: CPT

## 2025-06-02 PROCEDURE — 80048 BASIC METABOLIC PNL TOTAL CA: CPT

## 2025-06-02 PROCEDURE — 85027 COMPLETE CBC AUTOMATED: CPT

## 2025-06-02 PROCEDURE — 93005 ELECTROCARDIOGRAM TRACING: CPT

## 2025-06-02 NOTE — DISCHARGE INSTRUCTIONS

## 2025-06-03 LAB
QT INTERVAL: 386 MS
QTC INTERVAL: 391 MS

## 2025-06-09 ENCOUNTER — TELEPHONE (OUTPATIENT)
Dept: UROLOGY | Facility: CLINIC | Age: 60
End: 2025-06-09
Payer: COMMERCIAL

## 2025-06-09 NOTE — TELEPHONE ENCOUNTER
Called patient to remind them to arrive at patient registration on 6/11/2025 at 6:00 am for the procedure with Dr. Albert. Left message with patient.  Patient was informed that the surgery schedule fluctuates so there is no given start time. And to be prepared to be here all day. Told patient if they had any questions to please contact our office at [phone].

## 2025-06-11 ENCOUNTER — ANESTHESIA (OUTPATIENT)
Dept: PERIOP | Facility: HOSPITAL | Age: 60
End: 2025-06-11
Payer: COMMERCIAL

## 2025-06-11 ENCOUNTER — ANESTHESIA EVENT (OUTPATIENT)
Dept: PERIOP | Facility: HOSPITAL | Age: 60
End: 2025-06-11
Payer: COMMERCIAL

## 2025-06-11 ENCOUNTER — HOSPITAL ENCOUNTER (OUTPATIENT)
Facility: HOSPITAL | Age: 60
Setting detail: HOSPITAL OUTPATIENT SURGERY
Discharge: HOME | End: 2025-06-11
Attending: UROLOGY | Admitting: UROLOGY
Payer: COMMERCIAL

## 2025-06-11 VITALS
RESPIRATION RATE: 16 BRPM | DIASTOLIC BLOOD PRESSURE: 68 MMHG | SYSTOLIC BLOOD PRESSURE: 119 MMHG | TEMPERATURE: 97.4 F | HEART RATE: 67 BPM | OXYGEN SATURATION: 100 %

## 2025-06-11 DIAGNOSIS — R97.20 ELEVATED PROSTATE SPECIFIC ANTIGEN (PSA): ICD-10-CM

## 2025-06-11 LAB
GLUCOSE BLDC GLUCOMTR-MCNC: 114 MG/DL (ref 70–130)
GLUCOSE BLDC GLUCOMTR-MCNC: 153 MG/DL (ref 70–130)

## 2025-06-11 PROCEDURE — 88341 IMHCHEM/IMCYTCHM EA ADD ANTB: CPT | Performed by: UROLOGY

## 2025-06-11 PROCEDURE — 25010000002 LIDOCAINE PF 2% 2 % SOLUTION: Performed by: NURSE ANESTHETIST, CERTIFIED REGISTERED

## 2025-06-11 PROCEDURE — 55700 PR PROSTATE NEEDLE BIOPSY ANY APPROACH: CPT | Performed by: UROLOGY

## 2025-06-11 PROCEDURE — 76942 ECHO GUIDE FOR BIOPSY: CPT | Performed by: UROLOGY

## 2025-06-11 PROCEDURE — S0260 H&P FOR SURGERY: HCPCS | Performed by: UROLOGY

## 2025-06-11 PROCEDURE — 25810000003 LACTATED RINGERS PER 1000 ML: Performed by: UROLOGY

## 2025-06-11 PROCEDURE — G0416 PROSTATE BIOPSY, ANY MTHD: HCPCS | Performed by: UROLOGY

## 2025-06-11 PROCEDURE — 82948 REAGENT STRIP/BLOOD GLUCOSE: CPT

## 2025-06-11 PROCEDURE — 25010000002 CEFAZOLIN PER 500 MG: Performed by: UROLOGY

## 2025-06-11 PROCEDURE — 25010000002 FENTANYL CITRATE (PF) 100 MCG/2ML SOLUTION: Performed by: NURSE ANESTHETIST, CERTIFIED REGISTERED

## 2025-06-11 PROCEDURE — 25010000002 PROPOFOL 10 MG/ML EMULSION: Performed by: NURSE ANESTHETIST, CERTIFIED REGISTERED

## 2025-06-11 RX ORDER — ONDANSETRON 2 MG/ML
4 INJECTION INTRAMUSCULAR; INTRAVENOUS ONCE AS NEEDED
Status: DISCONTINUED | OUTPATIENT
Start: 2025-06-11 | End: 2025-06-11 | Stop reason: HOSPADM

## 2025-06-11 RX ORDER — SODIUM CHLORIDE 0.9 % (FLUSH) 0.9 %
10 SYRINGE (ML) INJECTION EVERY 12 HOURS SCHEDULED
Status: DISCONTINUED | OUTPATIENT
Start: 2025-06-11 | End: 2025-06-11 | Stop reason: HOSPADM

## 2025-06-11 RX ORDER — FLUMAZENIL 0.1 MG/ML
0.2 INJECTION INTRAVENOUS AS NEEDED
Status: DISCONTINUED | OUTPATIENT
Start: 2025-06-11 | End: 2025-06-11 | Stop reason: HOSPADM

## 2025-06-11 RX ORDER — SODIUM CHLORIDE, SODIUM LACTATE, POTASSIUM CHLORIDE, CALCIUM CHLORIDE 600; 310; 30; 20 MG/100ML; MG/100ML; MG/100ML; MG/100ML
1000 INJECTION, SOLUTION INTRAVENOUS CONTINUOUS
Status: DISCONTINUED | OUTPATIENT
Start: 2025-06-11 | End: 2025-06-11 | Stop reason: HOSPADM

## 2025-06-11 RX ORDER — HYDROCODONE BITARTRATE AND ACETAMINOPHEN 5; 325 MG/1; MG/1
1 TABLET ORAL EVERY 8 HOURS PRN
Qty: 6 TABLET | Refills: 0 | Status: SHIPPED | OUTPATIENT
Start: 2025-06-11

## 2025-06-11 RX ORDER — HYDROCODONE BITARTRATE AND ACETAMINOPHEN 10; 325 MG/1; MG/1
1 TABLET ORAL EVERY 4 HOURS PRN
Status: DISCONTINUED | OUTPATIENT
Start: 2025-06-11 | End: 2025-06-11 | Stop reason: HOSPADM

## 2025-06-11 RX ORDER — SODIUM CHLORIDE 0.9 % (FLUSH) 0.9 %
3 SYRINGE (ML) INJECTION AS NEEDED
Status: DISCONTINUED | OUTPATIENT
Start: 2025-06-11 | End: 2025-06-11 | Stop reason: HOSPADM

## 2025-06-11 RX ORDER — DEXTROSE MONOHYDRATE 25 G/50ML
12.5 INJECTION, SOLUTION INTRAVENOUS AS NEEDED
Status: DISCONTINUED | OUTPATIENT
Start: 2025-06-11 | End: 2025-06-11 | Stop reason: HOSPADM

## 2025-06-11 RX ORDER — FENTANYL CITRATE 50 UG/ML
INJECTION, SOLUTION INTRAMUSCULAR; INTRAVENOUS AS NEEDED
Status: DISCONTINUED | OUTPATIENT
Start: 2025-06-11 | End: 2025-06-11 | Stop reason: SURG

## 2025-06-11 RX ORDER — HYDROCODONE BITARTRATE AND ACETAMINOPHEN 5; 325 MG/1; MG/1
1 TABLET ORAL EVERY 4 HOURS PRN
Status: DISCONTINUED | OUTPATIENT
Start: 2025-06-11 | End: 2025-06-11 | Stop reason: HOSPADM

## 2025-06-11 RX ORDER — LIDOCAINE HYDROCHLORIDE 20 MG/ML
INJECTION, SOLUTION EPIDURAL; INFILTRATION; INTRACAUDAL; PERINEURAL AS NEEDED
Status: DISCONTINUED | OUTPATIENT
Start: 2025-06-11 | End: 2025-06-11 | Stop reason: SURG

## 2025-06-11 RX ORDER — FENTANYL CITRATE 50 UG/ML
25 INJECTION, SOLUTION INTRAMUSCULAR; INTRAVENOUS
Status: DISCONTINUED | OUTPATIENT
Start: 2025-06-11 | End: 2025-06-11 | Stop reason: HOSPADM

## 2025-06-11 RX ORDER — LIDOCAINE HYDROCHLORIDE 10 MG/ML
0.5 INJECTION, SOLUTION EPIDURAL; INFILTRATION; INTRACAUDAL; PERINEURAL ONCE AS NEEDED
Status: DISCONTINUED | OUTPATIENT
Start: 2025-06-11 | End: 2025-06-11 | Stop reason: HOSPADM

## 2025-06-11 RX ORDER — NALOXONE HCL 0.4 MG/ML
0.4 VIAL (ML) INJECTION AS NEEDED
Status: DISCONTINUED | OUTPATIENT
Start: 2025-06-11 | End: 2025-06-11 | Stop reason: HOSPADM

## 2025-06-11 RX ORDER — PROPOFOL 10 MG/ML
VIAL (ML) INTRAVENOUS AS NEEDED
Status: DISCONTINUED | OUTPATIENT
Start: 2025-06-11 | End: 2025-06-11 | Stop reason: SURG

## 2025-06-11 RX ORDER — EPHEDRINE SULFATE 50 MG/ML
INJECTION, SOLUTION INTRAVENOUS AS NEEDED
Status: DISCONTINUED | OUTPATIENT
Start: 2025-06-11 | End: 2025-06-11 | Stop reason: SURG

## 2025-06-11 RX ORDER — SUCCINYLCHOLINE/SOD CL,ISO/PF 200MG/10ML
SYRINGE (ML) INTRAVENOUS AS NEEDED
Status: DISCONTINUED | OUTPATIENT
Start: 2025-06-11 | End: 2025-06-11 | Stop reason: SURG

## 2025-06-11 RX ORDER — ROCURONIUM BROMIDE 10 MG/ML
INJECTION, SOLUTION INTRAVENOUS AS NEEDED
Status: DISCONTINUED | OUTPATIENT
Start: 2025-06-11 | End: 2025-06-11 | Stop reason: SURG

## 2025-06-11 RX ORDER — LABETALOL HYDROCHLORIDE 5 MG/ML
5 INJECTION, SOLUTION INTRAVENOUS
Status: DISCONTINUED | OUTPATIENT
Start: 2025-06-11 | End: 2025-06-11 | Stop reason: HOSPADM

## 2025-06-11 RX ORDER — IBUPROFEN 600 MG/1
600 TABLET, FILM COATED ORAL EVERY 6 HOURS PRN
Status: DISCONTINUED | OUTPATIENT
Start: 2025-06-11 | End: 2025-06-11 | Stop reason: HOSPADM

## 2025-06-11 RX ORDER — SODIUM CHLORIDE 0.9 % (FLUSH) 0.9 %
10 SYRINGE (ML) INJECTION AS NEEDED
Status: DISCONTINUED | OUTPATIENT
Start: 2025-06-11 | End: 2025-06-11 | Stop reason: HOSPADM

## 2025-06-11 RX ORDER — ULTRASOUND COUPLING MEDIUM
GEL (GRAM) TOPICAL AS NEEDED
Status: DISCONTINUED | OUTPATIENT
Start: 2025-06-11 | End: 2025-06-11 | Stop reason: HOSPADM

## 2025-06-11 RX ORDER — FENTANYL CITRATE 50 UG/ML
50 INJECTION, SOLUTION INTRAMUSCULAR; INTRAVENOUS
Status: DISCONTINUED | OUTPATIENT
Start: 2025-06-11 | End: 2025-06-11 | Stop reason: HOSPADM

## 2025-06-11 RX ORDER — HYDROCODONE BITARTRATE AND ACETAMINOPHEN 5; 325 MG/1; MG/1
1 TABLET ORAL ONCE AS NEEDED
Refills: 0 | Status: DISCONTINUED | OUTPATIENT
Start: 2025-06-11 | End: 2025-06-11 | Stop reason: HOSPADM

## 2025-06-11 RX ADMIN — FENTANYL CITRATE 100 MCG: 50 INJECTION, SOLUTION INTRAMUSCULAR; INTRAVENOUS at 09:21

## 2025-06-11 RX ADMIN — CEFAZOLIN 2000 MG: 2 INJECTION, POWDER, FOR SOLUTION INTRAMUSCULAR; INTRAVENOUS at 09:25

## 2025-06-11 RX ADMIN — Medication 120 MG: at 09:21

## 2025-06-11 RX ADMIN — LIDOCAINE HYDROCHLORIDE 100 MG: 20 INJECTION, SOLUTION EPIDURAL; INFILTRATION; INTRACAUDAL; PERINEURAL at 09:21

## 2025-06-11 RX ADMIN — SODIUM CHLORIDE, POTASSIUM CHLORIDE, SODIUM LACTATE AND CALCIUM CHLORIDE 1000 ML: 600; 310; 30; 20 INJECTION, SOLUTION INTRAVENOUS at 06:34

## 2025-06-11 RX ADMIN — ROCURONIUM 10 MG: 50 INJECTION, SOLUTION INTRAVENOUS at 09:21

## 2025-06-11 RX ADMIN — PROPOFOL 200 MG: 10 INJECTION, EMULSION INTRAVENOUS at 09:21

## 2025-06-11 RX ADMIN — EPHEDRINE SULFATE 10 MG: 50 INJECTION, SOLUTION INTRAVENOUS at 09:33

## 2025-06-11 NOTE — DISCHARGE INSTRUCTIONS
Pain Control:  - Start by applying ice packs or heating pads and using over the counter medications such as acetaminophen (Tylenol) or ibuprofen (Advil or Motrin) unless otherwise specified by your doctor.  - If your pain is still uncontrolled, you may take a stronger, prescription medication that contains opioids. Take only as much of this medication as you need.  - As you recover, your pain will decrease and you will need less pain medication. You should only take pain medication if you are in pain.  - If you are no longer taking any of your opioid medication and have leftover pills, dispose of them by placing them in a plastic bag, adding dish soap, and throwing them in the trash.    Diet:   [place] diet:  At first eat a bland diet; avoid foods that are high in fiber, have a lot of spices, or are high in fat. You can begin slowly eating these foods when you are feeling better.    Activity:  - Do NOT lift anything over 10 pounds for 2 days  - Do NOT drive or operate machinery if you are taking narcotic pain medicine.  - Do NOT take baths or swim in pools or hot tubs for 1 week   - Walking is encouraged. Climbing stairs is OK.  - Avoid sexual activity for 7 days.    As part of your prostate biopsy procedure, a needle was inserted into the skin between the anus and scrotum. In order to promote healing:  - Do NOT sit in hard backed chairs, ride bicycles, or engage in other activities that may put pressure on the perineal region.  - Do NOT place anything in your rectum. This includes medications such as suppositories, applicators of any sort, enemas, sexual paraphernalia, or anal intercourse.   - Avoid straining to urinate or have a bowel movement. Take stool softeners as necessary to help with constipation.     Contact your doctor ([phone]) if:  - You have a fever higher than 101 F (38.3 C).  - You have nausea, vomiting or diarrhea that persists.  - You are unable to urinate.   - You have large blood clots in your  "urine. (Small amounts of blood-tinged urine and a few small clots are normal, especially after physical activity.)      You have received anesthesia, therefore, for the next 24 hours and/or while taking narcotic pain medication;   -Do NOT drive a vehicle   -Do NOT drink alcohol   -Do NOT make important personal or business decisions or sign legal documents.   Examples of narcotic pain medication include Percocet, Oxycontin, Norco, Hydrocodone, and Oxycodone.     When will my biopsy results be available?  -Results from the biopsy vary from 2 to 7 days.  This is most dependent upon the volume of pathology specimens throughout the entire hospital.  Some pathologists may require extra staining or cutting of tissue which can add 1 to 2days to results.  Rarely a pathology specimen will be sent off for second opinion.  It is important know that a second opinion should not be something to despair over because oftentimes a normal variant has been identified that is not cancer.  These can take up to a couple weeks to return.    - If you have \"My Chart\" , you will probably see your results before Dr. Albert will see them.  Oftentimes patients will get results 1 to 2 days prior to the physician.  This is because immediate release is now required to patients.  If you have not been contacted by Dr. Albert within 2 days of seeing your results or within 7 days from having the biopsy, call the office to notify us.  Sometimes the doctor will want to discuss the case with the pathologist prior to contacting you.   "

## 2025-06-11 NOTE — OP NOTE
Operative Summary    Juarez Wade  Date of Procedure: 6/11/2025    Pre-op Diagnosis:   Elevated prostate specific antigen (PSA) [R97.20]    Post-op Diagnosis:     Post-Op Diagnosis Codes:     * Elevated prostate specific antigen (PSA) [R97.20]    Procedure/CPT® Codes:  No CPT Code Applied in Case Entry    Procedure(s):  PROSTATE ULTRASOUND BIOPSY MRI FUSION WITH URONAV    Surgeon(s):  Blake Albert MD    Anesthesia: General    Staff:   Circulator: Dami Olivera RN  Scrub Person: Bridger Elena; Miranda Jaffe    Indications for procedure:  Prostate lesion(s) identified on MRI as part of evaluation for elevated PSA    Findings:       Gland is small.  Transition zone really not enlarged.  I cannot distinguish the lesion is a hypoechoic area although the entire prostate seems a bit hypoechoic to me.  Seminal vesicles are unremarkable.  There is no obvious extracapsular extension.    Procedure details:  Patient is taken to the operating room where he is given general anesthesia.  This is done to limit number of transrectal biopsies that were done.  The patient is placed in the dorsal lithotomy position with the legs supported in stirrups.  Previous MRI images are reviewed as they have been loaded into the UroNav system. The electromagnetic generator for probe detection is attached to the bed and positioned appropriately. Using the B&K ultrasound, the transrectal probe is inserted to identify the mid portion of the prostate gland in the transverse image.  Measurements of the width and height of the gland are then obtained.  The multiplanar probe is then used to take sagittal images of the prostate and again measurement is taken of the length of the gland.  The prostate volume is calculated using height times with times length ×1/2 which is then compared to the volume of the MRI.    The pre-procedural MRI (along with the segmentation and targeting data as determined by the radiologist) selected for fusion biopsy  "is then overlaid \"fused\" to the 3D TRUS volume of the prostate constructed from a series of 2D TRUS images obtained by a \"sweep\" of the TRUS probe.  Both rigid and elastic registration is carried out using the UroNav software.    The region of interest is identified in the Rightmid portion of the prostate.  The target is identified as drawn by the radiologist using the UroNav software that included the bulls eye with the sagittal plane of the multiplanar probe. 3 biopsies are taken from the region of interest and labeled as \"region of interest #1 \"on the pathology requisition.  The midportion was biopsied with the other biopsies being a few millimeters away toward the periphery of the lesion by adjusting the probe.  These biopsies were taken transperineally using the precision point device.  Probe is rotated toward the lesion.  At that point the trocar is inserted into the perineum at the appropriate AP plane.  Once the trocar was inserted through the skin the probe was rotated in a way that I could do multiple samples.      After biopsies of  the suspicious lesion(s) were completed, ultrasound-guided \"template mapped \"biopsies were performed again using ultrasound guidance with the sagittal and transverse settings of the multiplanar probe.   The brachystand apparatus is attached to the table in the usual fashion.  The probe was placed in the cradle.  A new biopsy needle was used.  Again the precision point device was used as previously described.    Prostate was again scanned from the base of the bladder to the apex.    Transverse and sagittal imaging of the prostate are taken. I was able to map the prostate for biopsies to sample the remaining areas not biopsied on the fusion biopsy    The systematic templated biopsies and those of the region of interest(s) were reviewed and felt to be appropriate sampling of each.  The probe was removed.    Estimated Blood Loss: <30 mL    Specimens:                Specimens       " ID Source Type Tests Collected By Collected At Frozen?    B Prostate Tissue TISSUE PATHOLOGY EXAM   Blake Albert MD 6/11/25 0856     Description: RIGHT POSTERIOR LATERAL    E Prostate Tissue TISSUE PATHOLOGY EXAM   Blake Albert MD 6/11/25 0856     Description: RIGHT ANTERIOR LATERAL    F Prostate Tissue TISSUE PATHOLOGY EXAM   Blake Albert MD 6/11/25 0856     Description: LEFT POSTERIOR MEDIAL    G Prostate Tissue TISSUE PATHOLOGY EXAM   Blake Albert MD 6/11/25 0856     Description: LEFT POSTERIOR LATERAL    H Prostate Tissue TISSUE PATHOLOGY EXAM   Blake Albert MD 6/11/25 0856     Description: LEFT BASE    J Prostate Tissue TISSUE PATHOLOGY EXAM   Blake Albert MD 6/11/25 0856     Description: LEFT ANTERIOR LATERAL    K Prostate Tissue TISSUE PATHOLOGY EXAM   Blake Albert MD 6/11/25 0935     Description: LESION 1 TARGET 1-3              Drains: None    Complications: none    Plan: Follow-up to be determined by biopsy results    Blake Albert MD     Date: 6/11/2025  Time: 09:51 CDT

## 2025-06-11 NOTE — H&P
Urology H&P    Mr. Wade is 60 y.o. male    CHIEF COMPLAINT: Here for prostate UroNav guided & templated prostate biopsies.     HPI  Patient underwent MRI imaging which shows evidence of lesions which warrant biopsy.  He denies any dysuria, fever or flank pain.    The following portions of the patient's history were reviewed and updated as appropriate: allergies, current medications, past family history, past medical history, past social history, past surgical history and problem list.    Review of Systems  Review  of systems  Constitutional: Negative for chills and fever.   Gastrointestinal: Negative for abdominal pain, anal bleeding and blood in stool.   Genitourinary: Negative for flank pain and hematuria.      Medications Prior to Admission   Medication Sig Dispense Refill Last Dose/Taking    amLODIPine-benazepril (LOTREL) 10-40 MG per capsule Take 1 capsule by mouth Daily. 30 capsule 11 6/10/2025 at  6:30 AM    aspirin 81 MG EC tablet Take 1 tablet by mouth Daily.   6/10/2025 Morning    atorvastatin (LIPITOR) 80 MG tablet Take 1 tablet by mouth Daily. 30 tablet 11 6/10/2025 Morning    busPIRone (BUSPAR) 7.5 MG tablet Take 0.5 tablets by mouth 2 (Two) Times a Day. 90 tablet 3 6/11/2025 at  4:00 AM    diclofenac (VOLTAREN) 75 MG EC tablet Take 1 tablet by mouth 2 (Two) Times a Day. 60 tablet 11 6/10/2025    fluticasone (FLONASE) 50 MCG/ACT nasal spray Administer 2 sprays into the nostril(s) as directed by provider Daily As Needed for Rhinitis or Allergies. 16 g 11 6/10/2025    furosemide (LASIX) 40 MG tablet Take 1 tablet by mouth Daily. 30 tablet 11 6/10/2025    isosorbide mononitrate (IMDUR) 60 MG 24 hr tablet Take 1 tablet by mouth Daily. 90 tablet 3 6/10/2025    metFORMIN (GLUCOPHAGE) 500 MG tablet Take 1 tablet by mouth Daily With Breakfast. 30 tablet 11 6/10/2025    omeprazole (priLOSEC) 20 MG capsule Take 1 capsule by mouth Daily.   6/10/2025    predniSONE (DELTASONE) 10 MG tablet Take 1 tablet by  mouth Daily.   6/10/2025    sodium phosphate (FLEET) 7-19 GM/118ML ADULT enema Use rectally the morning of prostate biopsy 1 each 0 6/11/2025 at  4:00 AM    vitamin D (ERGOCALCIFEROL) 1.25 MG (57519 UT) capsule capsule Take 1 capsule by mouth Every 7 (Seven) Days. 12 capsule 3 6/6/2025    melatonin 5 MG tablet tablet Take 1 tablet by mouth.   More than a month    ondansetron (ZOFRAN) 4 MG tablet Take 1 tablet by mouth Daily. (Patient taking differently: Take 1 tablet by mouth As Needed.) 20 tablet 5 More than a month         Current Facility-Administered Medications:     ceFAZolin 2000 mg IVPB in 100 mL NS (MBP), 2,000 mg, Intravenous, Once, Livan Alberts MD    lactated ringers infusion 1,000 mL, 1,000 mL, Intravenous, Continuous, Blake Albert MD, Last Rate: 25 mL/hr at 06/11/25 0634, 1,000 mL at 06/11/25 0634    lidocaine PF 1% (XYLOCAINE) injection 0.5 mL, 0.5 mL, Intradermal, Once PRN, Blake Albert MD    sodium chloride 0.9 % flush 3 mL, 3 mL, Intravenous, PRN, Blake Albert MD    Past Medical History:   Diagnosis Date    Anxiety     Arrhythmia ?    Cardiac abnormality 03/07/2024    GERD (gastroesophageal reflux disease)     Hyperlipidemia     Hypertension     Sleep apnea 2005    Sustained SVT 01/05/2024    Vitamin D deficiency        Past Surgical History:   Procedure Laterality Date    APPENDECTOMY      CARDIAC ABLATION  03/07/2024    completed on this admission    CARDIAC CATHETERIZATION      CARDIAC ELECTROPHYSIOLOGY PROCEDURE N/A 3/7/2024    Procedure: Ablation SVT;  Surgeon: Raul Goodman MD;  Location: [facility] CATH INVASIVE LOCATION;  Service: Cardiovascular;  Laterality: N/A;    CHOLECYSTECTOMY      HERNIA REPAIR         Social History     Socioeconomic History    Marital status:    Tobacco Use    Smoking status: Every Day     Current packs/day: 0.00     Average packs/day: 0.5 packs/day for 10.0 years (5.0 ttl pk-yrs)     Types: Cigarettes     Start date: 9/14/2013     Last  attempt to quit: 2023     Years since quittin.7    Smokeless tobacco: Never   Vaping Use    Vaping status: Never Used   Substance and Sexual Activity    Alcohol use: Yes     Alcohol/week: 2.0 standard drinks of alcohol     Types: 2 Cans of beer per week     Comment: OCC    Drug use: Never    Sexual activity: Defer       Family History   Problem Relation Age of Onset    Heart disease Mother     Dementia Mother     No Known Problems Father     Cancer Maternal Uncle        /66 (BP Location: Left arm, Patient Position: Sitting)   Pulse 62   Temp 97.2 °F (36.2 °C) (Temporal)   Resp 16   SpO2 97%     Physical Exam  Constitutional:   Temp:  [97.2 °F (36.2 °C)] 97.2 °F (36.2 °C)  Heart Rate:  [62] 62  Resp:  [16] 16  BP: (134)/(66) 134/66  ] Well developed, well nourished, no distress  Respiratory:   Effort unlabored; Movements symmetric  GI:   No mass or hernia noted, not distended or tender   No enlargement of spleen or liver noted  Skin:   No pallor or cyanosis; No obvious rash  Psych:   Alert, Oriented x 3         Lab Results   Component Value Date    GLUCOSE 101 (H) 2025    BUN 23.3 (H) 2025    CREATININE 1.10 2025    EGFRIFNONA >60 2022    EGFRIFAFRI >59 2022    BCR 21.2 2025    CO2 24.0 2025    CALCIUM 9.2 2025    ALBUMIN 4.2 04/15/2025    AST 9 04/15/2025    ALT 19 04/15/2025     Lab Results   Component Value Date    GLUCOSE 101 (H) 2025    CALCIUM 9.2 2025     2025    K 4.6 2025    CO2 24.0 2025     2025    BUN 23.3 (H) 2025    CREATININE 1.10 2025    EGFRIFAFRI >59 2022    EGFRIFNONA >60 2022    BCR 21.2 2025    ANIONGAP 12.0 2025     Lab Results   Component Value Date    WBC 16.44 (H) 2025    HGB 8.7 (L) 2025    HCT 29.9 (L) 2025    MCV 70.0 (L) 2025     (H) 2025     Lab Results   Component Value Date    PSA 5.8 (H) 2025  "   PSA 5.0 (H) 04/15/2025    PSA 2.1 09/14/2023     No results found for: \"URINECX\"  Brief Urine Lab Results  (Last result in the past 365 days)        Color   Clarity   Blood   Leuk Est   Nitrite   Protein   CREAT   Urine HCG        05/01/25 1305 Yellow   Clear   Negative   Negative   Negative   30 mg/dL                     Imaging Results (Last 7 Days)       ** No results found for the last 168 hours. **            Assessment and Plan  Prostate lesion(s) on MRI    -Plan to do a UroNav fusion biopsy today.   I am going to do his templated biopsies by transperineal approach.  Patient has taken his enema.  He is started his antibiotic as recommended.  Will give IV dose gentamicin today.      (Please note that portions of this note were completed with a voice recognition program.)  Blake Albert MD  06/11/25  07:32 CDT             "

## 2025-06-11 NOTE — ANESTHESIA PROCEDURE NOTES
Airway  Reason: elective    Date/Time: 6/11/2025 9:22 AM  Airway not difficult    General Information and Staff    Patient location during procedure: OR  CRNA/CAA: Jean Ham, CRNA    Indications and Patient Condition  Indications for airway management: airway protection    Preoxygenated: yes    Mask difficulty assessment: 1 - vent by mask    Final Airway Details    Final airway type: endotracheal airway      Successful airway: ETT  Cuffed: yes   Successful intubation technique: direct laryngoscopy  Adjuncts used in placement: intubating stylet  Endotracheal tube insertion site: oral  Blade: Aprham  Blade size: 2  ETT size (mm): 7.5  Cormack-Lehane Classification: grade IIb - view of arytenoids or posterior of glottis only  Placement verified by: capnometry   Cuff volume (mL): 7  Measured from: lips  ETT/EBT  to lips (cm): 22  Number of attempts at approach: 1  Assessment: lips, teeth, and gum same as pre-op and atraumatic intubation

## 2025-06-11 NOTE — ANESTHESIA PREPROCEDURE EVALUATION
Anesthesia Evaluation     Patient summary reviewed   no history of anesthetic complications:   NPO Solid Status: > 8 hours             Airway   Mallampati: II  Dental    (+) partials    Pulmonary    (+) a smoker,sleep apnea  (-) COPD, asthma  Cardiovascular   Exercise tolerance: excellent (>7 METS)    (+) hypertension, hyperlipidemia  (-) pacemaker, past MI, angina, cardiac stents      Neuro/Psych  (-) seizures, TIA, CVA  GI/Hepatic/Renal/Endo    (+) obesity, GERD  (-) liver disease, no renal disease, diabetes    Musculoskeletal     Abdominal    Substance History      OB/GYN          Other                    Anesthesia Plan    ASA 2     general     intravenous induction     Anesthetic plan, risks, benefits, and alternatives have been provided, discussed and informed consent has been obtained with: patient.    CODE STATUS:

## 2025-06-11 NOTE — ANESTHESIA POSTPROCEDURE EVALUATION
Patient: Juarez Wade    Procedure Summary       Date: 06/11/25 Room / Location:  [facility]  /  [facility]    Anesthesia Start: 0916 Anesthesia Stop: 0947    Procedure: PROSTATE ULTRASOUND BIOPSY MRI FUSION WITH URONAV Diagnosis:       Elevated prostate specific antigen (PSA)      (Elevated prostate specific antigen (PSA) [R97.20])    Surgeons: Blake Albert MD Provider: Jean Ham CRNA    Anesthesia Type: general ASA Status: 2            Anesthesia Type: general    Vitals  Vitals Value Taken Time   /63 06/11/25 10:15   Temp 97.4 °F (36.3 °C) 06/11/25 10:15   Pulse 63 06/11/25 10:21   Resp 13 06/11/25 10:15   SpO2 96 % 06/11/25 10:21   Vitals shown include unfiled device data.        Post Anesthesia Care and Evaluation      Comments: Discharged per PACU Criteria

## 2025-06-12 ENCOUNTER — TELEPHONE (OUTPATIENT)
Dept: UROLOGY | Facility: CLINIC | Age: 60
End: 2025-06-12
Payer: COMMERCIAL

## 2025-06-12 NOTE — TELEPHONE ENCOUNTER
Called pt to let them know we would schedule a follow up once we get the pathology back. No answer, left message.

## 2025-06-13 LAB
CYTO UR: NORMAL
LAB AP CASE REPORT: NORMAL
LAB AP DIAGNOSIS COMMENT: NORMAL
Lab: NORMAL
PATH REPORT.FINAL DX SPEC: NORMAL
PATH REPORT.GROSS SPEC: NORMAL

## 2025-06-16 ENCOUNTER — RESULTS FOLLOW-UP (OUTPATIENT)
Dept: PERIOP | Facility: HOSPITAL | Age: 60
End: 2025-06-16
Payer: COMMERCIAL

## 2025-06-16 NOTE — PROGRESS NOTES
Patient is notified of his biopsy results.  He is going to come in tomorrow for a prostate cancer consult.

## 2025-06-17 ENCOUNTER — OFFICE VISIT (OUTPATIENT)
Dept: UROLOGY | Facility: CLINIC | Age: 60
End: 2025-06-17
Payer: COMMERCIAL

## 2025-06-17 DIAGNOSIS — C61 PROSTATE CANCER: Primary | ICD-10-CM

## 2025-06-17 NOTE — PROGRESS NOTES
Juarez Wade is a 60 y.o. male newly diagnosed with prostate cancer. His most recent PSA is 5.8.     He underwent a transrectal ultrasound-guided biopsy of the prostate on 6/13/2025. He had 0 prior negative biopsies. On the most recent ultrasound, the prostate size was 23 mL. The biopsy showed Galileo 4 + 4 prostate cancer in 3 of 7 total cores unilaterally. (Galileo assigned based on highest grade core).     Additional prognostic test results:   None    Objective    Imaging:   An MRI was performed on 5/23/2025. PI-RADS assessment 5.   Scan found no adenopathy.    Assessment and Plan    Clinical stage T3aN0 prostate cancer diagnosed in 3 of 7 cores. His ROSALBA score is 6 consistent with high risk disease.     Management options, including active surveillance, surgery, and radiation therapy, were discussed.      EXAM/TECHNIQUE: MRI pelvis without and with IV contrast   Prostate volume: 23.04 cc     The prostate transition zone is enlarged with benign prostatic  hyperplasia.    Lesion 1:   Side: right  Location: junction of medial and lateral  Zone: peripheral zone  Craniocaudal: [place] and mid gland  Key images: series 801, image 14  Size: 2.14 mL; largest axial dimensions 17 mm x 9 mm  Extraprostatic extension: Suspected  T2WI score: 5  DWI score: 5  DCE: negative  Overall PI-RADS 2 assessment: 5     Staging Information: There appears to be signal abnormality extending  beyond the prostate margin into the right posterolateral fat (image 14  series 801), though motion artifact limits evaluation. No enlarged lymph  nodes are identified. Indeterminate 1.9 cm T1 hypointense left inferior  pubic rami bone lesion on image 9 series 501.     Other findings: Rectum and visualized portion of the colon are  unremarkable. Visualized small bowel loops are nondilated and  noninflamed. No free pelvic fluid. No focal urinary bladder lesion.  Bilateral right greater than left fat-containing inguinal hernias.     IMPRESSION:      Lesion in the right posterior prostate involving the apex and mid gland  is at very high suspicion for malignancy with an overall PI-RADS score  of 5. There is suspected extraprostatic extension of disease into the  right posterior lateral fat although motion artifact slightly limits  evaluation. No pelvic lymphadenopathy. There is an indeterminate bone  lesion in the left inferior pubic rami.        This report was signed and finalized on 5/23/2025 8:33 AM by Dr. Nathen Diaz MD.      Biopsy results  Final Diagnosis  1.  Prostate, right posterior lateral, needle-core biopsy:               - Prostate acinar adenocarcinoma, [place] score 4+3 = 7 (grade group 3), involving approximately 90%   of total biopsy surface area; longest contiguous focus measures 7 mm.               - Percentage of pattern 4:  90%.               - Positive for perineural invasion.               - Minute focus of intraductal carcinoma is present.     2.  Prostate, right anterior lateral, needle-core biopsy:               - Prostate acinar adenocarcinoma, Galileo score 4+4 = 8 (grade group 4) involving greater than 95%   of total biopsy surface area; longest contiguous focus measures 6 mm.               - Positive for perineural invasion.     3.  Prostate, left posterior medial, needle-core biopsy:               - Single atypical gland.     4.  Prostate, left posterior lateral, needle-core biopsy:               - Benign prostate glands and stroma.     5.  Prostate, left base, needle-core biopsy:               - Benign prostate glands and stroma.     6.  Prostate, left anterior lateral, biopsy:               - Benign prostate glands and stroma.     7.  Prostate, lesion 1, target 1-3, needle-core biopsy:               - Prostate acinar adenocarcinoma, Galileo score 4+3 = 7 (grade group 3), involving approximately 80%   of total biopsy surface area; longest contiguous focus measures 12 mm.               - Percentage of pattern 4:  90%.                - Positive for perineural invasion.           Electronically signed by Erin Baires MD on 6/13/2025 at 1700 CDT    In today's discussion we focused on the following:  -I began by explaining the biopsy to him.  Patient has an Acinar adenocarcinoma adenocarcinoma .   We discussed how the [place] grade and core volume placed patients in risk categories.  I explained that because we have a 4+4 that he is considered to have Prostate cancer risk groups: High risk - PSA >20 or Grade Group 4 or 5 OR Clinical Stage t3 or greater) risk disease at this time when combined with digital rectal exam and PSA.       -We discussed staging of prostate cancer.  I did explain to the patient conventional imaging is indicated for high risk disease.  Conventional imaging consists of multiparametric MRI of the pelvis or CT abdomen and pelvis with contrast combined with a bone scan.   I did explain at this point we do not routinely stage with  F-18 PSMA CT PET because we would be hesitant to make treatment decisions, particularly withholding primary curative therapy, for patient with oligometastasis.  However I have used the test extensively on patients who have developed biochemical recurrence after definitive therapy.  I did tell him that we will attempt to get one if he strongly desires to have it.      -I did not discuss tissue molecular markers since the patient has high risk disease.  I do not think this would affect his risk stratification or approach to primary curative therapy.  It is my opinion that it  should only be used to consider appropriateness of  active surveillance versus primary curative therapy in low or intermediate disease risk disease.      -We discussed how risk stratification is used to determine therapy.  I explained that active surveillance is only appropriate for patients that have low or select patients with favorable intermediate risk disease.     -I explained  that most patients with intermediate  risk disease and patients with high risk disease felt to be clinically localized as determined by digital rectal exam, cross-sectional imaging (CT or MRI), and pathology from the prostate biopsy, should be offered primary curative therapy.  If all other characteristics are equal I explained that patients with Galileo grade group 4 (3+5 or 4+4) have a better response to primary curative therapy than patients with [place] grade group 5(4+5; 5+4 or 5+5) disease in general.     -I explained to the patient that standard of care for treatment of possible clinically localized high risk prostate cancer is offering primary curative therapy via the following options:  Radical Prostatectomy +/- pelvic lymphadenectomy  Prostate Brachytherapy + External Beam Radiotherapy + Androgen Deprivation Therapy  External Beam Radiation Therapy + Androgen Deprivation Therapy    -I explained that radical prostatectomy has effective long-term cancer control.  I explained that since the prostate is removed we have better prediction of prognosis based on the pathologic features.  I also explained that PSA failure is easy to detect should there be a recurrence of prostate cancer.  I also explained that should he have a PSA recurrence, salvage radiotherapy can be done as an adjuvant treatment with reasonable success and tolerable side effect profile.  I explained to him why I do robot-assisted laparoscopic prostatectomy including its advantages of a shorter hospital stay, decreased blood loss, and quicker return to usual activities.  I did explain the risks of erectile dysfunction, infertility with loss of ejaculation, incontinence, bladder neck contracture especially if there is a vesicourethral anastomotic leak, and rectal injury.  I described rectal injury is a rare complication from the procedure.  I explained that incontinence probably occurs in about 15% of men with only about a third of those needing surgical management to have a  tolerable level of urinary control.  I also explained that approximately a third of patients will be unable to get an erection even with medications after prostatectomy.  I told him he is particularly at risk for this since he has high risk disease and we will likely have to do a wide excision that could include a significant portion of the neurovascular bundle on the side(s) cancer is present.  Also explained that there is probably  1 in 3 risk of PSA recurrence requiring additional therapy. Explained since the use of Pylarify F-18 PSMA CT PET in PSA recurrence,  I choose not to do pelvic lymphadenectomy except where there is obvious adenopathy.  I explained that pelvic lymphadenectomy does not increase chance of cure but some feel it is necessary because systemic therapy, usually androgen deprivation therapy, can be instituted immediately in the setting of metastasis to lymph nodes.  It is my experience that patients that have oligometastatic disease to lymph nodes will be detected better by Pylarify F-18 PSMA CT PET then the lymph node dissection.  This is especially true since the pattern of node positivity often involves the upper aspect of the retroperitoneum or presacral nodes.  These nodes would note removed in a pelvic lymph node dissection. Avoiding lymph node dissection decreases the risk of lymphedema postoperatively as well as injury to the obturator nerve.     - I explained that in high risk patients may choose prostate brachytherapy, clinical evidence shows that external beam radiotherapy and androgen deprivation therapy should be used as well.  I explained that we do not do prostate brachytherapy anymore but that I have referred patients to [facility] for this.  My experience with prostate brachytherapy when we were still doing it at this institution was that it added and increased number of side effects with only comparable but not superior results then external beam radiation therapy.  Lastly I  explained the recommendation for high risk disease is to do a prostate brachytherapy boost to the prostate followed by EBRT and ADT as described below.    -We  discussed external beam radiotherapy with androgen deprivation therapy.  I explained that external beam radiation therapy has shown effective long-term cancer control.  Also explained there is a very low risk of urinary incontinence although some patients may experience urinary urge type incontinence at a comparable or possibly lower rate to those who develop stress incontinence following prostatectomy.  Studies have confirmed the patient's choosing radiation therapy should receive androgen deprivation therapy for at least 2 years.  It appears that patients benefit from this as an adjuvant or concurrent/adjuvant therapy as long as it is given for 2 years.  It is explained the side effects of radiation therapy include changes in urination as well as changes in bowel movements.  Some patients do experience diarrhea.  There are patients who suffer from radiation proctitis which could become a chronic problem causing blood in stool with or without diarrhea.  It does appear this can be reduced by injecting perirectal spacer gel pretreatment.  We have now been doing this for 2 years at our institution.  We are seeing a clinical benefit.      -It does appear that each of these options have equal efficacy in the treatment of high risk prostate cancer.  The only studies that have shown surgery to be superior appear to have been tailored to accomplish that outcome.  I think he is a good candidate for any of these options.  We did discuss how PSA only recurrence often occurs after primary curative therapy for high risk disease.  I explained how salvage radiotherapy is much better tolerated than salvage prostatectomy.  I explained the latter is only done at certain tertiary care centers.     -We briefly discussed proton therapy.    I did explain that this is another way  of delivering radiation to the prostate but with the theoretical advantage of limiting radiation damage to surrounding tissues.  Studies have not conclusively shown this in my opinion.  Nonetheless it is more available to our region since there are now proton treatment centers in [facility], and [place].     -I did briefly mention cryotherapy, high intensity focused ultrasound and focal therapy for prostate cancer.  I explained lactose controlled studies probably make any of these options more appropriate investigational studies.  However I did offer to find him a urologist that does these procedures should he be interested.       ASSESSMENT AND PLAN          Problem List Items Addressed This Visit    None  Visit Diagnoses         Prostate cancer    -  Primary    Relevant Orders    NM PET/CT Skull Base to Mid Thigh            He does have a concerning finding on his MRI of the pelvis.  This needs further evaluation.One option is to do a PSMA CT-PET scan  I explained it to the patient by answering the following anticipated questions.     I explained that  PSMA stands for prostate-specific membrane antigen. That may sound intimidating, but essentially it is a protein that is found mainly on prostate cancer cells. PET stands for positron emission tomography, which again is not as intimidating as it sounds, really all it is, is a scan that uses a special dye with radioactive tracers that allow doctors to scan for cancer or other diseases. So in essence PSMA-PET is an exciting new body scan that can help doctors see and track otherwise hard-to-find prostate cancer, potentially earlier, and in much smaller amounts compared with imaging that is currently used.    I explained that a PSMA-PET scan is done by injecting patients with a small radioactive molecule that is attracted to PSMA. The radioactive part will light up when a whole body scan is done, which then allows us to have a clear image of exactly where  the prostate cancer is in a person’s body. (Note that this FDA approval is for 2 particular kinds of PSMA-PET scanning. We have available Pylarify which is  68Ga-PSMA-11 PET.) Researchers believe that PSMA-PET will be  a more accurate way to find where prostate cancer may have spread in the body, allowing for better treatment planning . Studies are ongoing to determine how exactly it will be used to assign the best course of treatment to patients.    Risks of the procedure were discussed.  I explained that many have negative associations to the word radiation, so having a radioactive molecule injected in your body may sound scary. But the radioactive molecule is very safe - it’s given in extremely small quantities, and no side effects have been reported among hundreds of thousands of patients receiving PSMA radiotracers. It was recently FDA approved after years of clinical research. Radiation exposure is through the PET scan, or the PET/CT scan if a CT scan is also used. However, the amount of radiation exposure from a PSMA-PET/CT scan was found to be lower than with current standard-of-care imaging techniques.    We talked about indications for the procedure.  Any time a new treatment or detection method is approved it is always very exciting news for patients across the board, but not all of these are right for every patient. This current FDA approval is for two main groups of patients,  - high-risk men before treatment with prostatectomy or radiation therapy,   - men who have already been treated for localized prostate cancer who have a rising PSA.” So, these are patients who may have metastases, and the purpose of the scan is to find out whether distant disease is present, and, if so, where it is.    Other options discussed including conventional imaging with bone scan and CT of the abdomen pelvis with contrast.  I explained that the contrast is probably more likely to cause problems with the CT than with the  PSMA.  Also explained that this test is definitely more specific (positive test is probably prostate cancer) and certainly as sensitive (ability to detect recurrent cancer) as conventional imaging.    He has opted for a  PSMA CT PET.     He is leaning toward the option of EBRT plus ADT but the PSMA PET scan is not positive for metastatic disease to bone.      I spent 45 minutes caring for Juarez on this date of service. This time includes time spent by me in the following activities:preparing for the visit, reviewing tests, counseling and educating the patient/family/caregiver, ordering medications, tests, or procedures, and referring and communicating with other health care professionals   FOLLOW UP     Return for PSMA PET; F/U approximately 1 week after the study, Follow-up approximately 1 week after the study.        (Please note that portions of this note were completed with a voice recognition program.)  Blake Albert MD  06/18/25  15:15 CDT

## 2025-07-01 NOTE — PROGRESS NOTES
Chief Complaint  Prostate cancer    Subjective          Juarez Wade presents to [facility] UROLOGY   This is a patient with high-grade, [place] grade group 4, adenocarcinoma of the prostate and an MRI that was suspicious for extension into the right posterior lateral fat.  Also noted on the MRI was an indeterminant 1.9 cm T1 hypointense left inferior pubic rami bone lesion.  He returns today with a PSMA PET scan.          Current Outpatient Medications:     amLODIPine-benazepril (LOTREL) 10-40 MG per capsule, Take 1 capsule by mouth Daily., Disp: 30 capsule, Rfl: 11    aspirin 81 MG EC tablet, Take 1 tablet by mouth Daily., Disp: , Rfl:     atorvastatin (LIPITOR) 80 MG tablet, Take 1 tablet by mouth Daily., Disp: 30 tablet, Rfl: 11    busPIRone (BUSPAR) 7.5 MG tablet, Take 0.5 tablets by mouth 2 (Two) Times a Day., Disp: 90 tablet, Rfl: 3    diclofenac (VOLTAREN) 75 MG EC tablet, Take 1 tablet by mouth 2 (Two) Times a Day., Disp: 60 tablet, Rfl: 11    fluticasone (FLONASE) 50 MCG/ACT nasal spray, Administer 2 sprays into the nostril(s) as directed by provider Daily As Needed for Rhinitis or Allergies., Disp: 16 g, Rfl: 11    furosemide (LASIX) 40 MG tablet, Take 1 tablet by mouth Daily., Disp: 30 tablet, Rfl: 11    HYDROcodone-acetaminophen (NORCO) 5-325 MG per tablet, Take 1 tablet by mouth Every 8 (Eight) Hours As Needed for Moderate Pain, Disp: 6 tablet, Rfl: 0    isosorbide mononitrate (IMDUR) 60 MG 24 hr tablet, Take 1 tablet by mouth Daily., Disp: 90 tablet, Rfl: 3    melatonin 5 MG tablet tablet, Take 1 tablet by mouth., Disp: , Rfl:     metFORMIN (GLUCOPHAGE) 500 MG tablet, Take 1 tablet by mouth Daily With Breakfast., Disp: 30 tablet, Rfl: 11    omeprazole (priLOSEC) 20 MG capsule, Take 1 capsule by mouth Daily., Disp: , Rfl:     ondansetron (ZOFRAN) 4 MG tablet, Take 1 tablet by mouth Daily. (Patient taking differently: Take 1 tablet by mouth As Needed.), Disp: 20 tablet, Rfl: 5     "predniSONE (DELTASONE) 10 MG tablet, Take 1 tablet by mouth Daily., Disp: , Rfl:     sodium phosphate (FLEET) 7-19 GM/118ML ADULT enema, Use rectally the morning of prostate biopsy, Disp: 1 each, Rfl: 0    vitamin D (ERGOCALCIFEROL) 1.25 MG (36389 UT) capsule capsule, Take 1 capsule by mouth Every 7 (Seven) Days., Disp: 12 capsule, Rfl: 3  Past Medical History:   Diagnosis Date    Anxiety     Arrhythmia ?    Cardiac abnormality 03/07/2024    GERD (gastroesophageal reflux disease)     Hyperlipidemia     Hypertension     Sleep apnea 2005    Sustained SVT 01/05/2024    Vitamin D deficiency      Past Surgical History:   Procedure Laterality Date    APPENDECTOMY      CARDIAC ABLATION  03/07/2024    completed on this admission    CARDIAC CATHETERIZATION      CARDIAC ELECTROPHYSIOLOGY PROCEDURE N/A 3/7/2024    Procedure: Ablation SVT;  Surgeon: Raul Goodman MD;  Location:  [facility] CATH INVASIVE LOCATION;  Service: Cardiovascular;  Laterality: N/A;    CHOLECYSTECTOMY      HERNIA REPAIR      PROSTATE BIOPSY N/A 6/11/2025    Procedure: PROSTATE ULTRASOUND BIOPSY MRI FUSION WITH URONAV;  Surgeon: Blake Albert MD;  Location:  [facility] OR;  Service: Urology;  Laterality: N/A;           Review of Systems      Objective   PHYSICAL EXAM  Vital Signs:   Temp 97.2 °F (36.2 °C)   Ht 171.1 cm (67.36\")   Wt 105 kg (232 lb 6.4 oz)   BMI 36.01 kg/m²     Physical Exam      DATA  Result Review :              Results for orders placed or performed during the hospital encounter of 06/11/25   POC Glucose Once    Collection Time: 06/11/25  6:32 AM    Specimen: Blood   Result Value Ref Range    Glucose 114 70 - 130 mg/dL   Tissue Pathology Exam    Collection Time: 06/11/25  8:56 AM    Specimen: B: Prostate; Tissue    E: Prostate; Tissue    F: Prostate; Tissue    G: Prostate; Tissue    H: Prostate; Tissue    J: Prostate; Tissue    K: Prostate; Tissue   Result Value Ref Range    Note to Patients       This report may contain a detailed " description of human tissue sent by a health care provider to the laboratory for pathologic evaluation. The content of this report is essential for diagnosis and may provide important critical findings. This information may be unfamiliar to patients to review without a medical professional present. It is advised that the patient review this report in the presence of a health care provider who can answer questions and explain the results.      Case Report       Surgical Pathology Report                         Case: [id number]                                  Authorizing Provider:  Blake Albert MD       Collected:           06/11/2025 08:56 AM          Ordering Location:     [facility] OR  Received:            06/11/2025 02:13 PM          Pathologist:           Erin Baires MD                                                         Specimens:   1) - Prostate, RIGHT POSTERIOR LATERAL                                                              2) - Prostate, RIGHT ANTERIOR LATERAL                                                               3) - Prostate, LEFT POSTERIOR MEDIAL                                                                4) - Prostate, LEFT POSTERIOR LATERAL                                                               5) - Prostate, LEFT BASE                                                                            6) - Prostate, LEFT ANTERIOR LATERAL                                                                 7) - Prostate, LESION 1 TARGET 1-3                                                         Final Diagnosis       1.  Prostate, right posterior lateral, needle-core biopsy:   - Prostate acinar adenocarcinoma, Galile[place] score 4+3 = 7 (grade group 3), involving approximately 90%   of total biopsy surface area; longest contiguous focus measures 7 mm.   - Percentage of pattern 4:  90%.   - Positive for perineural invasion.   - Minute focus of intraductal carcinoma is  "present.    2.  Prostate, right anterior lateral, needle-core biopsy:   - Prostate acinar adenocarcinoma, Galileo score 4+4 = 8 (grade group 4) involving greater than 95%   of total biopsy surface area; longest contiguous focus measures 6 mm.   - Positive for perineural invasion.    3.  Prostate, left posterior medial, needle-core biopsy:   - Single atypical gland.    4.  Prostate, left posterior lateral, needle-core biopsy:   - Benign prostate glands and stroma.    5.  Prostate, left base, needle-core biopsy:   - Benign prostate glands and stroma.    6.  Prostate, left anterior lateral, biopsy:   - Benign prostate glands and stroma.    7.  Prostate, lesion 1, target 1-3, needle-core biopsy:   - Prostate acinar adenocarcinoma, [place] score 4+3 = 7 (grade group 3), involving approximately 80%   of total biopsy surface area; longest contiguous focus measures 12 mm.   - Percentage of pattern 4:  90%.   - Positive for perineural invasion.            Comment       Immunohistochemical stains for p63 and AMACR are performed on all specimens, and aid in the above diagnoses.      Gross Description       1. Prostate.  The specimen is received in formalin, labeled with the patient's name, MRN, and \"right posterior lateral\" and it consists of single tan-brown cylindrical core tissue, 1.0 cm.  The specimen is submitted entirely in 1 cassette.    2. Prostate.  The specimen is received in formalin, labeled with the patient's name, MRN, and \"right anterior lateral\" and it consists of single portion of tan tissue, 0.9 cm.  The specimen is submitted entirely in 1 cassette.    3. Prostate.  The specimen is received in formalin, labeled with the patient's name, MRN, and \"left posterior medial\" and it consists of a single tan-brown cylindrical core tissue, 1.5 cm.  The specimen is submitted entirely in 1 cassette.    4. Prostate.  The specimen is received in formalin, labeled with the patient's name, MRN, and \"left posterior lateral\" " "and it consists of a single tan-brown cylindrical core tissue, 1.3 cm.  The specimen is submitted entirely in 1 cassette.    5. Prostate.  The specimen is received in formalin, labeled with the patient's name, MRN, and \"left base\" and it consists of single tan-brown cylindrical core of tan tissue, 1.2 cm.  The specimen is submitted entirely in 1 cassette.    6. Prostate.  The specimen is received in formalin, labeled with the patient's name, MRN, and \"left anterior lateral\" and it consists of single tan-brown cylindrical core tissue, 1.4 cm.  The specimen is submitted entirely in 1 cassette.    7. Prostate.  The specimen is received in formalin, labeled with the patient's name, MRN, and \"lesion 1 target 1-3\" and it consists of 5 tan-brown cylindrical cores of tissue, 0.3 to 1.3 cm.  The specimen is submitted entirely in 1 cassette.        Microscopic Description       1-7.  Microscopic performed.     POC Glucose Once    Collection Time: 06/11/25  9:53 AM    Specimen: Blood   Result Value Ref Range    Glucose 153 (H) 70 - 130 mg/dL       NM PET/CT Skull Base to Mid Thigh (07/09/2025 14:23)      High risk adenocarcinoma prostate with abnormal MRI with lesion  to bony pelvis.; [id number] neoplasm of prostate.     Images are stored in PACS per institutional protocols and regulatory  requirements.     Dose:  1. 5.5 mCi gallium-68 PSMA Illuccix intravenously, administered at the  right antecubital fossa at 1250 hours on the date of the exam, the  patient's weight = 235 pounds, uptake time before imaging is 62 minutes.  2. 712 mGy centimeters for the CT portion of the examination. Automatic  exposure control was utilized.     REPORT: Multiplanar PET imaging was performed with field-of-view  extending from the top of the skull through the mid thighs. Fusion CT is  also obtained for attenuation correction purposes and localization.     COMPARISON: MRI of the pelvis with and without contrast 5/23/2025.     Background " physiologic uptake is present within the lacrimal, parotid  and submandibular glands, as well as the small intestine, kidneys,  liver, spleen and bladder. No abnormal activity is seen in the chest.  There is mild respiratory motion artifact on images through the abdomen  and pelvis. There is low level uptake within the prostate gland,  nonfocal and slightly greater on the left. There is a sclerotic lesion  within the left anterior pubic ramus which measures approximately 1.8  cm, this demonstrates abnormal activity, with a maximum SUV of 13.1.  This is compatible with a solitary metastatic lesion, likely related to  the suspected prostate neoplasm, given the elevated PSA values.     The fusion CT is reviewed separately, mild emphysematous changes are  present. Cholecystectomy clips are present. Calcified plaque is noted  within the aorta and its branches, particularly the renal artery  origins. There is a small fat-containing periumbilical ventral hernia  with a maximum size 1.6 cm. A few scattered diverticula are present  within the left colon without evidence of diverticulitis. Also, there  are are multiple diverticula within the transverse and right colon,  without evidence of diverticulitis. A fat-containing right inguinal  hernia is present with a maximum diameter of 3.3 cm. No evidence of  intra-abdominal or pelvic lymphadenopathy is identified.     IMPRESSION:  1. Solitary hypermetabolic lesion in the left inferior pubic ramus,  measuring 1.8 cm, compatible with a metastatic lesion, likely associated  with the biopsy proven prostate carcinoma. There is only low-grade  prostate activity, slightly greater on the left. No evidence of  intra-abdominal or pelvic lymphadenopathy.        This report was signed and finalized on 7/9/2025 2:57 PM by Dr. Sean Wilson MD.      Reviewed report and the images.  The MRI finding is concerning for metastatic lesion to bone.  I do not see any other evidence of  retroperitoneal or pelvic lymphadenopathy.  The chest also appears to be clear./DLS         ASSESSMENT AND PLAN          Problem List Items Addressed This Visit    None  Visit Diagnoses         Prostate cancer metastatic to bone        Relevant Orders    Ambulatory Referral to Oncology          Patient with high-grade adenocarcinoma prostate and a PSMA positive inferior pubic ramus lesion concerning for metastatic prostate cancer.  We need to initiate androgen deprivation therapy.  He should also consider androgen receptor inhibitor therapy in combination.  I will initiate Firmagon followed with transition to leuprolide.    The use of androgen ablation including its mechanism, options of delivery including medications versus surgical castration, as well as risks and benefits are discussed.  I discussed that hormonal therapy, while not curative, can help stop the growth and/or spread of cancer in the prostate. I explained that it does appear to increase survival in some situations.  I explained that typically the response to hormonal therapy is about 2 years after which other options will be considered.  I explained the meaning of  castrate resistant prostate cancer.  I also explained it can shrink and enlarged prostate which may be beneficial for urinary drainage of both the upper and lower tract.  I also explained that hormonal therapy has the risks of erectile dysfunction, osteoporosis (use of calcium with vitamin D is discussed), loss of libido, decrease in muscle mass (weightbearing exercises recommended), hot flashes, breast enlargement, decreased mental acuity, depression, weight gain and metabolic syndrome (the DASH diet is recommended as well as routine follow-up with primary care physician).     Will refer to oncology for consideration of KINSEY.    FOLLOW UP     Return in about 3 months (around 10/22/2025) for PSA before visit.        (Please note that portions of this note were completed with a voice  recognition program.)  Blake Albert MD  07/24/25  06:29 CDT

## 2025-07-09 ENCOUNTER — HOSPITAL ENCOUNTER (OUTPATIENT)
Dept: CT IMAGING | Facility: HOSPITAL | Age: 60
Discharge: HOME | End: 2025-07-09
Payer: COMMERCIAL

## 2025-07-09 DIAGNOSIS — C61 PROSTATE CANCER: ICD-10-CM

## 2025-07-09 PROCEDURE — A9596 GALLIUM GA 68 GOZETOTIDE 25 MCG KIT: HCPCS | Performed by: UROLOGY

## 2025-07-09 PROCEDURE — 78815 PET IMAGE W/CT SKULL-THIGH: CPT

## 2025-07-09 PROCEDURE — 34310000005 GALLIUM GA 68 GOZETOTIDE 25 MCG KIT: Performed by: UROLOGY

## 2025-07-09 RX ADMIN — KIT FOR THE PREPARATION OF GALLIUM GA 68 GOZETOTIDE INJECTION 1 DOSE: KIT INTRAVENOUS at 12:50

## 2025-07-22 ENCOUNTER — OFFICE VISIT (OUTPATIENT)
Dept: UROLOGY | Facility: CLINIC | Age: 60
End: 2025-07-22
Payer: COMMERCIAL

## 2025-07-22 VITALS — WEIGHT: 232.4 LBS | HEIGHT: 67 IN | TEMPERATURE: 97.2 F | BODY MASS INDEX: 36.47 KG/M2

## 2025-07-22 DIAGNOSIS — C61 PROSTATE CANCER METASTATIC TO BONE: ICD-10-CM

## 2025-07-22 DIAGNOSIS — C79.51 PROSTATE CANCER METASTATIC TO BONE: ICD-10-CM

## 2025-07-22 PROCEDURE — 99214 OFFICE O/P EST MOD 30 MIN: CPT | Performed by: UROLOGY

## 2025-07-29 PROBLEM — C61 PROSTATE CANCER: Status: ACTIVE | Noted: 2025-07-29

## 2025-08-05 ENCOUNTER — SPECIALTY PHARMACY (OUTPATIENT)
Dept: ONCOLOGY | Facility: HOSPITAL | Age: 60
End: 2025-08-05
Payer: COMMERCIAL

## 2025-08-05 ENCOUNTER — INFUSION (OUTPATIENT)
Dept: ONCOLOGY | Facility: HOSPITAL | Age: 60
End: 2025-08-05
Payer: COMMERCIAL

## 2025-08-05 ENCOUNTER — CONSULT (OUTPATIENT)
Dept: ONCOLOGY | Facility: CLINIC | Age: 60
End: 2025-08-05
Payer: COMMERCIAL

## 2025-08-05 ENCOUNTER — LAB (OUTPATIENT)
Dept: LAB | Facility: HOSPITAL | Age: 60
End: 2025-08-05
Payer: COMMERCIAL

## 2025-08-05 ENCOUNTER — RESULTS FOLLOW-UP (OUTPATIENT)
Dept: ONCOLOGY | Facility: CLINIC | Age: 60
End: 2025-08-05

## 2025-08-05 VITALS
HEIGHT: 67 IN | HEART RATE: 74 BPM | WEIGHT: 223.7 LBS | TEMPERATURE: 97.3 F | RESPIRATION RATE: 16 BRPM | BODY MASS INDEX: 35.11 KG/M2 | SYSTOLIC BLOOD PRESSURE: 122 MMHG | OXYGEN SATURATION: 97 % | DIASTOLIC BLOOD PRESSURE: 64 MMHG

## 2025-08-05 VITALS
SYSTOLIC BLOOD PRESSURE: 116 MMHG | HEART RATE: 60 BPM | RESPIRATION RATE: 20 BRPM | WEIGHT: 223.7 LBS | HEIGHT: 67 IN | DIASTOLIC BLOOD PRESSURE: 62 MMHG | OXYGEN SATURATION: 98 % | BODY MASS INDEX: 35.11 KG/M2 | TEMPERATURE: 96.6 F

## 2025-08-05 DIAGNOSIS — C61 PROSTATE CANCER: Primary | ICD-10-CM

## 2025-08-05 DIAGNOSIS — C61 PROSTATE CANCER: ICD-10-CM

## 2025-08-05 DIAGNOSIS — D50.8 IRON DEFICIENCY ANEMIA SECONDARY TO INADEQUATE DIETARY IRON INTAKE: ICD-10-CM

## 2025-08-05 LAB
ALBUMIN SERPL-MCNC: 3.9 G/DL (ref 3.5–5.2)
ALBUMIN/GLOB SERPL: 1.2 G/DL
ALP SERPL-CCNC: 112 U/L (ref 39–117)
ALT SERPL W P-5'-P-CCNC: 10 U/L (ref 1–41)
ANION GAP SERPL CALCULATED.3IONS-SCNC: 11 MMOL/L (ref 5–15)
AST SERPL-CCNC: 8 U/L (ref 1–40)
BASOPHILS # BLD AUTO: 0.16 10*3/MM3 (ref 0–0.2)
BASOPHILS NFR BLD AUTO: 1 % (ref 0–1.5)
BILIRUB SERPL-MCNC: 0.2 MG/DL (ref 0–1.2)
BUN SERPL-MCNC: 27.9 MG/DL (ref 8–23)
BUN/CREAT SERPL: 23.3 (ref 7–25)
CALCIUM SPEC-SCNC: 9 MG/DL (ref 8.6–10.5)
CHLORIDE SERPL-SCNC: 100 MMOL/L (ref 98–107)
CO2 SERPL-SCNC: 23 MMOL/L (ref 22–29)
CREAT SERPL-MCNC: 1.2 MG/DL (ref 0.76–1.27)
DEPRECATED RDW RBC AUTO: 44.6 FL (ref 37–54)
EGFRCR SERPLBLD CKD-EPI 2021: 68.8 ML/MIN/1.73
EOSINOPHIL # BLD AUTO: 0.35 10*3/MM3 (ref 0–0.4)
EOSINOPHIL NFR BLD AUTO: 2.2 % (ref 0.3–6.2)
ERYTHROCYTE [DISTWIDTH] IN BLOOD BY AUTOMATED COUNT: 18.8 % (ref 12.3–15.4)
FERRITIN SERPL-MCNC: 10.8 NG/ML (ref 30–400)
GLOBULIN UR ELPH-MCNC: 3.2 GM/DL
GLUCOSE SERPL-MCNC: 103 MG/DL (ref 65–99)
HCT VFR BLD AUTO: 26.6 % (ref 37.5–51)
HGB BLD-MCNC: 7.5 G/DL (ref 13–17.7)
IMM GRANULOCYTES # BLD AUTO: 0.14 10*3/MM3 (ref 0–0.05)
IMM GRANULOCYTES NFR BLD AUTO: 0.9 % (ref 0–0.5)
IRON 24H UR-MRATE: 11 MCG/DL (ref 59–158)
IRON SATN MFR SERPL: 2 % (ref 20–50)
LYMPHOCYTES # BLD AUTO: 2.66 10*3/MM3 (ref 0.7–3.1)
LYMPHOCYTES NFR BLD AUTO: 16.4 % (ref 19.6–45.3)
MCH RBC QN AUTO: 18.8 PG (ref 26.6–33)
MCHC RBC AUTO-ENTMCNC: 28.2 G/DL (ref 31.5–35.7)
MCV RBC AUTO: 66.5 FL (ref 79–97)
MONOCYTES # BLD AUTO: 1.19 10*3/MM3 (ref 0.1–0.9)
MONOCYTES NFR BLD AUTO: 7.4 % (ref 5–12)
NEUTROPHILS NFR BLD AUTO: 11.69 10*3/MM3 (ref 1.7–7)
NEUTROPHILS NFR BLD AUTO: 72.1 % (ref 42.7–76)
PLATELET # BLD AUTO: 568 10*3/MM3 (ref 140–450)
PMV BLD AUTO: 8.9 FL (ref 6–12)
POTASSIUM SERPL-SCNC: 4.9 MMOL/L (ref 3.5–5.2)
PROT SERPL-MCNC: 7.1 G/DL (ref 6–8.5)
RBC # BLD AUTO: 4 10*6/MM3 (ref 4.14–5.8)
RETICS # AUTO: 0.06 10*6/MM3 (ref 0.02–0.13)
RETICS/RBC NFR AUTO: 1.51 % (ref 0.7–1.9)
SODIUM SERPL-SCNC: 134 MMOL/L (ref 136–145)
TIBC SERPL-MCNC: 586 MCG/DL (ref 298–536)
TRANSFERRIN SERPL-MCNC: 393 MG/DL (ref 200–360)
WBC NRBC COR # BLD AUTO: 16.19 10*3/MM3 (ref 3.4–10.8)

## 2025-08-05 PROCEDURE — 80053 COMPREHEN METABOLIC PANEL: CPT

## 2025-08-05 PROCEDURE — 83540 ASSAY OF IRON: CPT

## 2025-08-05 PROCEDURE — 85045 AUTOMATED RETICULOCYTE COUNT: CPT

## 2025-08-05 PROCEDURE — 25010000002 DEGARELIX ACETATE 120 MG/VIAL RECONSTITUTED SOLUTION: Performed by: UROLOGY

## 2025-08-05 PROCEDURE — 82746 ASSAY OF FOLIC ACID SERUM: CPT

## 2025-08-05 PROCEDURE — 84466 ASSAY OF TRANSFERRIN: CPT

## 2025-08-05 PROCEDURE — 36415 COLL VENOUS BLD VENIPUNCTURE: CPT

## 2025-08-05 PROCEDURE — 99205 OFFICE O/P NEW HI 60 MIN: CPT | Performed by: INTERNAL MEDICINE

## 2025-08-05 PROCEDURE — 96402 CHEMO HORMON ANTINEOPL SQ/IM: CPT

## 2025-08-05 PROCEDURE — 82728 ASSAY OF FERRITIN: CPT

## 2025-08-05 PROCEDURE — 82607 VITAMIN B-12: CPT

## 2025-08-05 PROCEDURE — 85025 COMPLETE CBC W/AUTO DIFF WBC: CPT

## 2025-08-05 PROCEDURE — 84403 ASSAY OF TOTAL TESTOSTERONE: CPT

## 2025-08-05 PROCEDURE — 84153 ASSAY OF PSA TOTAL: CPT

## 2025-08-05 RX ORDER — ABIRATERONE 500 MG/1
1000 TABLET ORAL DAILY
Qty: 60 TABLET | Refills: 2 | Status: SHIPPED | OUTPATIENT
Start: 2025-08-05 | End: 2025-08-06

## 2025-08-05 RX ORDER — PREDNISONE 5 MG/1
5 TABLET ORAL 2 TIMES DAILY
Qty: 60 TABLET | Refills: 1 | Status: SHIPPED | OUTPATIENT
Start: 2025-08-05

## 2025-08-05 RX ORDER — FERROUS SULFATE 325(65) MG
325 TABLET ORAL
Qty: 60 TABLET | Refills: 2 | Status: SHIPPED | OUTPATIENT
Start: 2025-08-05

## 2025-08-05 RX ORDER — ONDANSETRON 8 MG/1
8 TABLET, FILM COATED ORAL EVERY 8 HOURS PRN
Qty: 60 TABLET | Refills: 2 | Status: SHIPPED | OUTPATIENT
Start: 2025-08-05

## 2025-08-05 RX ADMIN — DEGARELIX 240 MG: KIT at 14:40

## 2025-08-06 ENCOUNTER — TELEPHONE (OUTPATIENT)
Dept: ONCOLOGY | Facility: CLINIC | Age: 60
End: 2025-08-06
Payer: COMMERCIAL

## 2025-08-06 LAB
FOLATE SERPL-MCNC: 3.73 NG/ML (ref 4.78–24.2)
PSA SERPL-MCNC: 5.74 NG/ML (ref 0–4)
TESTOST SERPL-MCNC: 178 NG/DL (ref 193–740)
VIT B12 BLD-MCNC: 903 PG/ML (ref 211–946)

## 2025-08-06 RX ORDER — FOLIC ACID 1 MG/1
1 TABLET ORAL DAILY
Qty: 30 TABLET | Refills: 2 | Status: SHIPPED | OUTPATIENT
Start: 2025-08-06

## 2025-08-06 RX ORDER — ABIRATERONE 500 MG/1
1000 TABLET ORAL DAILY
Qty: 60 TABLET | Refills: 2 | Status: SHIPPED | OUTPATIENT
Start: 2025-08-06

## 2025-08-11 ENCOUNTER — TELEPHONE (OUTPATIENT)
Dept: ONCOLOGY | Facility: CLINIC | Age: 60
End: 2025-08-11
Payer: COMMERCIAL

## 2025-08-15 ENCOUNTER — TELEPHONE (OUTPATIENT)
Dept: OTOLARYNGOLOGY | Facility: CLINIC | Age: 60
End: 2025-08-15
Payer: COMMERCIAL

## 2025-08-29 DIAGNOSIS — M54.41 ACUTE MIDLINE LOW BACK PAIN WITH BILATERAL SCIATICA: ICD-10-CM

## 2025-08-29 DIAGNOSIS — M54.42 ACUTE MIDLINE LOW BACK PAIN WITH BILATERAL SCIATICA: ICD-10-CM

## 2025-08-29 RX ORDER — DICLOFENAC SODIUM 75 MG/1
75 TABLET, DELAYED RELEASE ORAL 2 TIMES DAILY
Qty: 60 TABLET | Refills: 11 | Status: SHIPPED | OUTPATIENT
Start: 2025-08-29

## (undated) DEVICE — SYRINGE,PISTON,IRRIGATION,60ML,STERILE: Brand: MEDLINE

## (undated) DEVICE — SUREFIT, DUAL DISPERSIVE ELECTRODE, CONTACT QUALITY MONITOR: Brand: SUREFIT

## (undated) DEVICE — TBG PRESS/MONITR FIX M/F LL A/ 48IN STRL

## (undated) DEVICE — SI AVANTI+ 10F STD W/GW: Brand: AVANTI

## (undated) DEVICE — BAPTIST TURNOVER KIT: Brand: MEDLINE INDUSTRIES, INC.

## (undated) DEVICE — Device: Brand: SOUNDSTAR

## (undated) DEVICE — VAGINAL PREP TRAY: Brand: MEDLINE INDUSTRIES, INC.

## (undated) DEVICE — KT NDL GUIDE STRL 18GA

## (undated) DEVICE — SYS COL WAST NAMIC IV SGL/LN FML/FIT W/VNT/SPK/HD 72IN

## (undated) DEVICE — ADHS SKIN PREMIERPRO EXOFIN TOPICAL HI/VISC .5ML

## (undated) DEVICE — TOWEL,OR,DSP,ST,BLUE,STD,4/PK,20PK/CS: Brand: MEDLINE

## (undated) DEVICE — STPCK 3/WY HP M/RA W/OFF/HNDL 1050PSI STRL

## (undated) DEVICE — HLDR PROB URONAV

## (undated) DEVICE — Device: Brand: WEBSTER CS

## (undated) DEVICE — Device: Brand: THERMOCOOL SMARTTOUCH SF

## (undated) DEVICE — SYS CLS VASC/VENI VASCADE MVP 6TO12F

## (undated) DEVICE — SI AVANTI+ 8F STD W/GW  NO OBT: Brand: AVANTI

## (undated) DEVICE — Device: Brand: REFERENCE PATCH CARTO 3

## (undated) DEVICE — Device: Brand: SMARTABLATE

## (undated) DEVICE — SPNG GZ WOVN 4X4IN 12PLY 10/BX STRL

## (undated) DEVICE — SOLIDIFIER LIQUI LOC PLUS 2000CC

## (undated) DEVICE — PK CATH CARD 30 CA/4

## (undated) DEVICE — MARKER,SKIN,WI/RULER AND LABELS: Brand: MEDLINE

## (undated) DEVICE — SOL IRR NACL 0.9PCT BT 1000ML

## (undated) DEVICE — DRSNG SURESITE123 6X8IN

## (undated) DEVICE — PAD, DEFIB, ADULT, RADIOTRANS, PHYSIO: Brand: MEDLINE

## (undated) DEVICE — MAX-CORE® DISPOSABLE CORE BIOPSY INSTRUMENT, 18G X 25CM: Brand: MAX-CORE

## (undated) DEVICE — MAX-CORE® DISPOSABLE CORE BIOPSY INSTRUMENT, 18G X 20CM: Brand: MAX-CORE

## (undated) DEVICE — PRESSURE MONITORING SET: Brand: TRUWAVE

## (undated) DEVICE — SI AVANTI+ 7F STD W/GW  NO OBT: Brand: AVANTI

## (undated) DEVICE — INTRO STEER AGILIS NXT MED/CURL 8.5F

## (undated) DEVICE — GLV SURG BIOGEL M LTX PF 8

## (undated) DEVICE — STERILE (15.2 TAPERED TO 7.6 X 183CM) POLYETHYLENE ACCORDION-FOLDED COVER FOR USE WITH SIEMENS ACUNAV ULTRASOUND CATHETER FAMILY CONNECTOR: Brand: SWIFTLINK TRANSDUCER COVER